# Patient Record
Sex: FEMALE | Race: WHITE | NOT HISPANIC OR LATINO | Employment: OTHER | URBAN - METROPOLITAN AREA
[De-identification: names, ages, dates, MRNs, and addresses within clinical notes are randomized per-mention and may not be internally consistent; named-entity substitution may affect disease eponyms.]

---

## 2017-04-10 ENCOUNTER — APPOINTMENT (OUTPATIENT)
Dept: PULMONOLOGY | Facility: HOSPICE | Age: 82
End: 2017-04-10
Payer: MEDICARE

## 2017-04-24 ENCOUNTER — OFFICE VISIT (OUTPATIENT)
Dept: CARDIOLOGY | Facility: PHYSICIAN GROUP | Age: 82
End: 2017-04-24
Payer: MEDICARE

## 2017-04-24 VITALS
BODY MASS INDEX: 15.16 KG/M2 | HEIGHT: 68 IN | WEIGHT: 100 LBS | SYSTOLIC BLOOD PRESSURE: 112 MMHG | HEART RATE: 102 BPM | OXYGEN SATURATION: 90 % | DIASTOLIC BLOOD PRESSURE: 74 MMHG

## 2017-04-24 DIAGNOSIS — E78.00 HYPERCHOLESTEREMIA: ICD-10-CM

## 2017-04-24 DIAGNOSIS — I51.7 LVH (LEFT VENTRICULAR HYPERTROPHY): ICD-10-CM

## 2017-04-24 DIAGNOSIS — I10 ESSENTIAL HYPERTENSION, BENIGN: ICD-10-CM

## 2017-04-24 DIAGNOSIS — I27.20 PULMONARY HYPERTENSION (HCC): ICD-10-CM

## 2017-04-24 DIAGNOSIS — I71.019 DISSECTION OF THORACIC AORTA (HCC): ICD-10-CM

## 2017-04-24 DIAGNOSIS — I11.9 BENIGN HYPERTENSIVE HEART DISEASE WITHOUT HEART FAILURE: ICD-10-CM

## 2017-04-24 DIAGNOSIS — I71.40 ABDOMINAL AORTIC ANEURYSM (AAA) WITHOUT RUPTURE (HCC): ICD-10-CM

## 2017-04-24 DIAGNOSIS — R94.31 NONSPECIFIC ABNORMAL ELECTROCARDIOGRAM (ECG) (EKG): ICD-10-CM

## 2017-04-24 PROCEDURE — G8419 CALC BMI OUT NRM PARAM NOF/U: HCPCS | Performed by: INTERNAL MEDICINE

## 2017-04-24 PROCEDURE — 99213 OFFICE O/P EST LOW 20 MIN: CPT | Performed by: INTERNAL MEDICINE

## 2017-04-24 PROCEDURE — 1101F PT FALLS ASSESS-DOCD LE1/YR: CPT | Mod: 8P | Performed by: INTERNAL MEDICINE

## 2017-04-24 PROCEDURE — 4040F PNEUMOC VAC/ADMIN/RCVD: CPT | Performed by: INTERNAL MEDICINE

## 2017-04-24 PROCEDURE — 1036F TOBACCO NON-USER: CPT | Performed by: INTERNAL MEDICINE

## 2017-04-24 PROCEDURE — G8432 DEP SCR NOT DOC, RNG: HCPCS | Performed by: INTERNAL MEDICINE

## 2017-04-24 ASSESSMENT — ENCOUNTER SYMPTOMS
COUGH: 0
WHEEZING: 0
PND: 0
BRUISES/BLEEDS EASILY: 0
ORTHOPNEA: 0
POLYDIPSIA: 0
MYALGIAS: 0

## 2017-04-24 ASSESSMENT — LIFESTYLE VARIABLES: SUBSTANCE_ABUSE: 0

## 2017-04-24 NOTE — PROGRESS NOTES
Subjective:   Janet Bartlett is a 89 y.o. female who presents today for follow-up of her hypertension primarily which has been recently very inactive on no medication. Her last CT aortogram showed healing of her aortic dissection and the small saccular aneurysm of her distal abdominal aorta.  No angina, palpitation or dyspnea.     Past Medical History   Diagnosis Date   • Hypertension    • Hypercholesteremia    • Mitral regurgitation    • Pulmonary hypertension (CMS-HCC)    • Aortic dissection (CMS-[facility])      [place] type B, healed as of last computed tomography scan in 2014.   • LVH (left ventricular hypertrophy) 2012     Anterior Q waves, probably due to LVH, normal wall motion and LVH confirmed by echo.    • Bronchiectasis (CMS-[facility])    • Empyema (CMS-[facility]) 5/2016     Left hemithorax, successfully drained and treated   • Aspiration pneumonia (CMS-[facility]) 5/2016   • Sepsis (CMS-[facility]) 5/2016   • Respiratory failure (CMS-[facility]) 5/2016   • Nonspecific abnormal electrocardiogram (ECG) (EKG)      See above, pattern stable.   • Abdominal aortic aneurysm (AAA) without rupture (CMS-[facility])      Small saccular aneurysm distal abdominal aorta      Past Surgical History   Procedure Laterality Date   • Other orthopedic surgery  open fracture reduction     Left arm   • Thoracotomy Left 5/27/2016     Procedure: THORACOTOMY Mini with;  Surgeon: Jose Lafleur M.D.;  Location: SURGERY [facility];  Service:    • Thoracoscopy Left 5/27/2016     Procedure: THORACOSCOPY;  Surgeon: Jose Lafleur M.D.;  Location: SURGERY [facility];  Service:    • Hip arthroplasty total Left      Prior to the above.     Family History   Problem Relation Age of Onset   • Heart Attack Mother    • Heart Attack Father    • Other Daughter      Multiple Sclerosis     History   Smoking status   • Never Smoker    Smokeless tobacco   • Never Used     Allergies   Allergen Reactions   • Penicillins Hives     Outpatient Encounter Prescriptions as of 4/24/2017  "  Medication Sig Dispense Refill   • acetaminophen 650 MG Tab 650 mg by Per NG Tube route every 6 hours as needed (Mild Pain; (Pain scale 1-3); Temp greater than 100.5 F). 30 Tab 0   • aspirin (ASA) 81 MG Chew Tab chewable tablet 1 Tab by Per NG Tube route every day. 100 Tab 11     No facility-administered encounter medications on file as of 4/24/2017.     Review of Systems   HENT: Negative for nosebleeds.    Respiratory: Negative for cough and wheezing.    Cardiovascular: Negative for orthopnea and PND.   Musculoskeletal: Negative for myalgias.   Endo/Heme/Allergies: Negative for polydipsia. Does not bruise/bleed easily.   Psychiatric/Behavioral: Negative for substance abuse.        Objective:   /74 mmHg  Pulse 102  Ht 1.727 m (5' 8\")  Wt 45.36 kg (100 lb)  BMI 15.21 kg/m2  SpO2 90%    Physical Exam   Constitutional: She is oriented to person, place, and time. She appears well-developed and well-nourished. No distress.   Eyes: Conjunctivae are normal. No scleral icterus.   Neck: No JVD present.   Cardiovascular: Normal rate, regular rhythm, S1 normal, S2 normal and intact distal pulses.  Exam reveals no gallop.    Murmur (2/6sem) heard.  Pulmonary/Chest: Effort normal and breath sounds normal.   Musculoskeletal: She exhibits no edema.   Neurological: She is alert and oriented to person, place, and time.   Skin: Skin is warm and dry.   Psychiatric: She has a normal mood and affect. Thought content normal.       Assessment:     1. Essential hypertension, benign     2. Benign hypertensive heart disease without heart failure     3. LVH (left ventricular hypertrophy)     4. Pulmonary hypertension (CMS-HCC)     5. Nonspecific abnormal electrocardiogram (ECG) (EKG)     6. Hypercholesteremia     7. Dissection of thoracic aorta (CMS-HCC)     8. Abdominal aortic aneurysm (AAA) without rupture (CMS-HCC)       The above assessed cardiovascular problems are clinically stable.  Her electrocardiograms are always read " as showing an old anterior infarct which has been excluded by serial echocardiograms. Electrocardiogram and immobilizer due to her left ventricular hypertrophy.   Medical Decision Making:  Today's Assessment / Status / Plan:     The situation is exactly as described on her last visit 4 months ago. She is still completely disabled from the hip is trying to decide what to do about that. She is of course at significant risk from any invasive procedure at her age with her cardiovascular problems but she is clinically stable and she did well with the left hip previously.  I will see her in 4 months and sooner if needed for any change in status.

## 2017-04-24 NOTE — Clinical Note
[facility] [address]  Phone: [phone]  Fax: [phone]              Janet Bartlett  11/24/1927    Encounter Date: 4/24/2017    Armani Arguelles M.D.          PROGRESS NOTE:  Subjective:   Janet Bartlett is a 89 y.o. female who presents today for follow-up of her hypertension primarily which has been recently very inactive on no medication. Her last CT aortogram showed healing of her aortic dissection and the small saccular aneurysm of her distal abdominal aorta.  No angina, palpitation or dyspnea.     Past Medical History   Diagnosis Date   • Hypertension    • Hypercholesteremia    • Mitral regurgitation    • Pulmonary hypertension (CMS-HCC)    • Aortic dissection (CMS-HCC)      [place] type B, healed as of last computed tomography scan in 2014.   • LVH (left ventricular hypertrophy) 2012     Anterior Q waves, probably due to LVH, normal wall motion and LVH confirmed by echo.    • Bronchiectasis (CMS-HCC)    • Empyema (CMS-HCC) 5/2016     Left hemithorax, successfully drained and treated   • Aspiration pneumonia (CMS-HCC) 5/2016   • Sepsis (CMS-HCC) 5/2016   • Respiratory failure (CMS-HCC) 5/2016   • Nonspecific abnormal electrocardiogram (ECG) (EKG)      See above, pattern stable.   • Abdominal aortic aneurysm (AAA) without rupture (CMS-HCC)      Small saccular aneurysm distal abdominal aorta      Past Surgical History   Procedure Laterality Date   • Other orthopedic surgery  open fracture reduction     Left arm   • Thoracotomy Left 5/27/2016     Procedure: THORACOTOMY Mini with;  Surgeon: Jose Lafleur M.D.;  Location: SURGERY [facility];  Service:    • Thoracoscopy Left 5/27/2016     Procedure: THORACOSCOPY;  Surgeon: Jose Lafleur M.D.;  Location: SURGERY [facility];  Service:    • Hip arthroplasty total Left      Prior to the above.     Family History   Problem Relation Age of Onset   • Heart Attack  "Mother    • Heart Attack Father    • Other Daughter      Multiple Sclerosis     History   Smoking status   • Never Smoker    Smokeless tobacco   • Never Used     Allergies   Allergen Reactions   • Penicillins Hives     Outpatient Encounter Prescriptions as of 4/24/2017   Medication Sig Dispense Refill   • acetaminophen 650 MG Tab 650 mg by Per NG Tube route every 6 hours as needed (Mild Pain; (Pain scale 1-3); Temp greater than 100.5 F). 30 Tab 0   • aspirin (ASA) 81 MG Chew Tab chewable tablet 1 Tab by Per NG Tube route every day. 100 Tab 11     No facility-administered encounter medications on file as of 4/24/2017.     Review of Systems   HENT: Negative for nosebleeds.    Respiratory: Negative for cough and wheezing.    Cardiovascular: Negative for orthopnea and PND.   Musculoskeletal: Negative for myalgias.   Endo/Heme/Allergies: Negative for polydipsia. Does not bruise/bleed easily.   Psychiatric/Behavioral: Negative for substance abuse.        Objective:   /74 mmHg  Pulse 102  Ht 1.727 m (5' 8\")  Wt 45.36 kg (100 lb)  BMI 15.21 kg/m2  SpO2 90%    Physical Exam   Constitutional: She is oriented to person, place, and time. She appears well-developed and well-nourished. No distress.   Eyes: Conjunctivae are normal. No scleral icterus.   Neck: No JVD present.   Cardiovascular: Normal rate, regular rhythm, S1 normal, S2 normal and intact distal pulses.  Exam reveals no gallop.    Murmur (2/6sem) heard.  Pulmonary/Chest: Effort normal and breath sounds normal.   Musculoskeletal: She exhibits no edema.   Neurological: She is alert and oriented to person, place, and time.   Skin: Skin is warm and dry.   Psychiatric: She has a normal mood and affect. Thought content normal.       Assessment:     1. Essential hypertension, benign     2. Benign hypertensive heart disease without heart failure     3. LVH (left ventricular hypertrophy)     4. Pulmonary hypertension (CMS-HCC)     5. Nonspecific abnormal " electrocardiogram (ECG) (EKG)     6. Hypercholesteremia     7. Dissection of thoracic aorta (CMS-HCC)     8. Abdominal aortic aneurysm (AAA) without rupture (CMS-HCC)       The above assessed cardiovascular problems are clinically stable.  Her electrocardiograms are always read as showing an old anterior infarct which has been excluded by serial echocardiograms. Electrocardiogram and immobilizer due to her left ventricular hypertrophy.   Medical Decision Making:  Today's Assessment / Status / Plan:     The situation is exactly as described on her last visit 4 months ago. She is still completely disabled from the hip is trying to decide what to do about that. She is of course at significant risk from any invasive procedure at her age with her cardiovascular problems but she is clinically stable and she did well with the left hip previously.  I will see her in 4 months and sooner if needed for any change in status.        No Recipients

## 2017-05-03 ENCOUNTER — OFFICE VISIT (OUTPATIENT)
Dept: PULMONOLOGY | Facility: HOSPICE | Age: 82
End: 2017-05-03
Payer: MEDICARE

## 2017-05-03 VITALS
WEIGHT: 102 LBS | BODY MASS INDEX: 15.46 KG/M2 | HEIGHT: 68 IN | SYSTOLIC BLOOD PRESSURE: 122 MMHG | DIASTOLIC BLOOD PRESSURE: 74 MMHG | RESPIRATION RATE: 16 BRPM | TEMPERATURE: 97.9 F | HEART RATE: 74 BPM | OXYGEN SATURATION: 93 %

## 2017-05-03 DIAGNOSIS — J86.9 EMPYEMA (HCC): ICD-10-CM

## 2017-05-03 DIAGNOSIS — I27.20 PULMONARY HYPERTENSION (HCC): ICD-10-CM

## 2017-05-03 DIAGNOSIS — J69.0 ASPIRATION PNEUMONIA, UNSPECIFIED ASPIRATION PNEUMONIA TYPE, UNSPECIFIED LATERALITY, UNSPECIFIED PART OF LUNG (HCC): ICD-10-CM

## 2017-05-03 DIAGNOSIS — J90 PLEURAL EFFUSION: ICD-10-CM

## 2017-05-03 PROCEDURE — G8419 CALC BMI OUT NRM PARAM NOF/U: HCPCS | Performed by: INTERNAL MEDICINE

## 2017-05-03 PROCEDURE — G8432 DEP SCR NOT DOC, RNG: HCPCS | Performed by: INTERNAL MEDICINE

## 2017-05-03 PROCEDURE — 4040F PNEUMOC VAC/ADMIN/RCVD: CPT | Performed by: INTERNAL MEDICINE

## 2017-05-03 PROCEDURE — 99214 OFFICE O/P EST MOD 30 MIN: CPT | Performed by: INTERNAL MEDICINE

## 2017-05-03 PROCEDURE — 1101F PT FALLS ASSESS-DOCD LE1/YR: CPT | Mod: 8P | Performed by: INTERNAL MEDICINE

## 2017-05-03 PROCEDURE — 1036F TOBACCO NON-USER: CPT | Performed by: INTERNAL MEDICINE

## 2017-05-03 RX ORDER — OMEGA-3 FATTY ACIDS/FISH OIL 300-1000MG
CAPSULE ORAL
COMMUNITY

## 2017-05-03 NOTE — Clinical Note
Eric Ko M.D.  [facility] Pulmonary [facility]   [address] [place] [address] [place] [address]  Phone: [phone] - Fax: [phone]           Encounter Date: 5/3/2017  Provider: Eric Ko M.D.  Location of Care: [facility] PULMONARY MEDICINE      Patient:   Janet Bartlett   MR Number: [id number]   YOB: 1927     PROGRESS NOTE:  Chief Complaint   Patient presents with   • Follow-Up     Review PFT and Labs       HPI:  The patient is a very thin, elderly, never smoker who was hospitalized in May 2016 with an aspiration pneumonia and empyema that required decortication. Pulmonary function testing in October 2016 demonstrated an FEV1 of 1.51 L which is 72% of predicted. Her lung volumes demonstrated hyperinflation. DLCO was not done. CT scan in July 2016 showed some residual pleural thickening. The patient also has a history of pulmonary hypertension on an echocardiogram when she was hospitalized. She had a 6 minute walk which showed no evidence of oxygen desaturation. At this time she is functioning well. She is accompanied by her granddaughter. She is not requiring any significant bronchodilator medications. She does have chronic hip pain. She has had a prior hip replacement and is considering a second hip replacement surgery. It sounds as though her discomfort is treated adequately with Advil on an as-needed basis.    Past Medical History   Diagnosis Date   • Hypertension    • Hypercholesteremia    • Mitral regurgitation    • Pulmonary hypertension (CMS-[facility])    • Aortic dissection (CMS-[facility])      [place] type B, healed as of last computed tomography scan in 2014.   • LVH (left ventricular hypertrophy) 2012     Anterior Q waves, probably due to LVH, normal wall motion and LVH confirmed by echo.    • Bronchiectasis (CMS-[facility])    • Empyema (CMS-[facility]) 5/2016     Left hemithorax, successfully drained and treated   • Aspiration pneumonia (CMS-[facility]) 5/2016   • Sepsis  (CMS-HCC) 5/2016   • Respiratory failure (CMS-HCC) 5/2016   • Nonspecific abnormal electrocardiogram (ECG) (EKG)      See above, pattern stable.   • Abdominal aortic aneurysm (AAA) without rupture (CMS-HCC)      Small saccular aneurysm distal abdominal aorta        ROS:   Constitutional: Denies fevers, chills, night sweats, fatigue or weight loss  Eyes: Denies vision loss, pain, drainage, double vision  Ears, Nose, Throat: Denies earache, tinnitus, hoarseness  Cardiovascular: Denies chest pain, tightness, palpitations  Respiratory: Denies  sputum production, cough, hemoptysis. Has some chronic dyspnea.  Sleep: Denies, snoring, apnea  GI: Denies abdominal pain, nausea, vomiting, diarrhea  : Denies frequent urination, hematuria, painful urination  Musculoskeletal: Denies back pain,  sore muscles. Has chronic right hip pain  Neurological: Denies headaches, seizures  Skin: Denies rashes, color changes  Psychiatric: Denies depression or thoughts of suicide  Hematologic: Denies bleeding tendency or clotting tendency  Allergic/Immunologic: Denies rhinitis, skin sensitivity    Social History     Social History   • Marital Status:      Spouse Name: N/A   • Number of Children: N/A   • Years of Education: N/A     Occupational History   • Not on file.     Social History Main Topics   • Smoking status: Never Smoker    • Smokeless tobacco: Never Used   • Alcohol Use: No   • Drug Use: No   • Sexual Activity: Not on file     Other Topics Concern   • Not on file     Social History Narrative     Penicillins  Current Outpatient Prescriptions on File Prior to Visit   Medication Sig Dispense Refill   • aspirin (ASA) 81 MG Chew Tab chewable tablet 1 Tab by Per NG Tube route every day. 100 Tab 11   • acetaminophen 650 MG Tab 650 mg by Per NG Tube route every 6 hours as needed (Mild Pain; (Pain scale 1-3); Temp greater than 100.5 F). (Patient not taking: Reported on 5/3/2017) 30 Tab 0     No current facility-administered  "medications on file prior to visit.     Blood pressure 122/74, pulse 74, temperature 36.6 °C (97.9 °F), resp. rate 16, height 1.727 m (5' 8\"), weight 46.267 kg (102 lb), SpO2 93 %.  Family History   Problem Relation Age of Onset   • Heart Attack Mother    • Heart Attack Father    • Other Daughter      Multiple Sclerosis       Physical Exam:  Thin elderly, no acute distress  HEENT: PERRLA, EOMI, no scleral icterus, no nasal or oral lesions  Neck: No thyromegaly, no adenopathy, no bruits  M[place]ampatti: Grade II  Lungs: Equal breath sounds, no wheezes or crackles  Heart: Regular rate and rhythm, no gallops or murmurs  Abdomen: Soft, benign, no organomegaly  Extremities: No clubbing, cyanosis, or edema  Neurologic: Cranial nerve, motor, and sensory exam are normal    1. Pulmonary hypertension (CMS-HCC)    2. Aspiration pneumonia, unspecified aspiration pneumonia type, unspecified laterality, unspecified part of lung (CMS-HCC)    3. Pleural effusion    4. Empyema (CMS-HCC)        She has recovered very nicely from her episode of empyema. She is not requiring any supplemental oxygen or any bronchodilators.  Her pulmonary function testing is consistent with some obstructive lung disease. She is a never smoker and has never had asthma.  These may be age-related changes.  She had pulmonary hypertension when she was hospitalized. Repeat echocardiogram at some time may be indicated.  I did discuss with her the fact that surgery is not without complications at times.  I did advise caution around proceeding with any future surgery because of her obstructive lung disease and history of pulmonary hypertension.        Electronically signed by Eric Ko M.D.  on 05/06/2017    No Recipients                    "

## 2017-05-03 NOTE — MR AVS SNAPSHOT
"        Janet Bartlett   5/3/2017 10:20 AM   Office Visit   MRN: [id number]    Department:  [facility]   Dept Phone:  [phone]    Description:  Female : 1927   Provider:  Eric Ko M.D.           Reason for Visit     Follow-Up Review PFT and Labs      Allergies as of 5/3/2017     Allergen Noted Reactions    Penicillins 2012   Hives      You were diagnosed with     Pulmonary hypertension (CMS-HCC)   [633818]       Aspiration pneumonia, unspecified aspiration pneumonia type, unspecified laterality, unspecified part of lung (CMS-HCC)   [5186031]       Pleural effusion   [602311]       Empyema (CMS-HCC)   [8249735]         Vital Signs     Blood Pressure Pulse Temperature Respirations Height Weight    122/74 mmHg 74 36.6 °C (97.9 °F) 16 1.727 m (5' 8\") 46.267 kg (102 lb)    Body Mass Index Oxygen Saturation Smoking Status             15.51 kg/m2 93% Never Smoker          Basic Information     Date Of Birth Sex Race Ethnicity Preferred Language    1927 Female White Non- English      Your appointments     Aug 21, 2017  2:40 PM   FOLLOW UP with Armani Arguelles M.D.   [facility]    [address] [place] [address] [phone]              Problem List              ICD-10-CM Priority Class Noted - Resolved    LVH (left ventricular hypertrophy) I51.7 Medium  Unknown - Present    Hypercholesteremia E78.00 Low  Unknown - Present    Tricuspid regurgitation I07.1 Medium  Unknown - Present    Pulmonary hypertension (CMS-HCC) I27.2 Medium  Unknown - Present    Benign hypertensive heart disease without heart failure I11.9 High  2012 - Present    Nonspecific abnormal electrocardiogram (ECG) (EKG) R94.31 Medium  3/11/2012 - Present    Vasovagal syncope R55 Medium  2013 - Present    Aortic dissection (CMS-HCC) I71.00 Low  2014 - Present    Pleural effusion J90 High  2016 - Present    Dysphagia R13.10 Medium  " 5/25/2016 - Present    Aspiration pneumonia (CMS-HCC) J69.0 High  5/25/2016 - Present    Essential hypertension, benign I10   12/22/2016 - Present    Abdominal aortic aneurysm (AAA) without rupture (CMS-HCC) I71.4   Unknown - Present      Health Maintenance        Date Due Completion Dates    IMM DTaP/Tdap/Td Vaccine (1 - Tdap) 11/24/1946 ---    PAP SMEAR 11/24/1948 ---    MAMMOGRAM 11/24/1967 ---    COLONOSCOPY 11/24/1977 ---    IMM ZOSTER VACCINE 11/24/1987 ---    BONE DENSITY 11/24/1992 ---            Current Immunizations     13-VALENT PCV PREVNAR 1/1/2016    Influenza TIV (IM) 10/24/2013    Influenza Vaccine Adult HD 10/10/2016  1:37 PM    Pneumococcal polysaccharide vaccine (PPSV-23) 4/24/2009      Below and/or attached are the medications your provider expects you to take. Review all of your home medications and newly ordered medications with your provider and/or pharmacist. Follow medication instructions as directed by your provider and/or pharmacist. Please keep your medication list with you and share with your provider. Update the information when medications are discontinued, doses are changed, or new medications (including over-the-counter products) are added; and carry medication information at all times in the event of emergency situations     Allergies:  PENICILLINS - Hives               Medications  Valid as of: May 03, 2017 - 11:00 AM    Generic Name Brand Name Tablet Size Instructions for use    Acetaminophen (Tab) Acetaminophen 650  mg by Per NG Tube route every 6 hours as needed (Mild Pain; (Pain scale 1-3); Temp greater than 100.5 F).        Aspirin (Chew Tab) ASA 81 MG 1 Tab by Per NG Tube route every day.        Ibuprofen (Cap) Ibuprofen 200 MG Take  by mouth.        .                 Medicines prescribed today were sent to:     [email or web address] [place] - [address] [place] [address] [address] [facility] [address]    Phone: [phone] Fax: [phone]    Open 24 Hours?: No       Medication refill instructions:       If your prescription bottle indicates you have medication refills left, it is not necessary to call your provider’s office. Please contact your pharmacy and they will refill your medication.    If your prescription bottle indicates you do not have any refills left, you may request refills at any time through one of the following ways: The online Exeros system (except Urgent Care), by calling your provider’s office, or by asking your pharmacy to contact your provider’s office with a refill request. Medication refills are processed only during regular business hours and may not be available until the next business day. Your provider may request additional information or to have a follow-up visit with you prior to refilling your medication.   *Please Note: Medication refills are assigned a new Rx number when refilled electronically. Your pharmacy may indicate that no refills were authorized even though a new prescription for the same medication is available at the pharmacy. Please request the medicine by name with the pharmacy before contacting your provider for a refill.           Exeros Access Code: [email or web address]  Expires: 5/24/2017  8:33 AM    Exeros  A secure, online tool to manage your health information     [employer] Exeros® is a secure, online tool that connects you to your personalized health information from the privacy of your home -- day or night - making it very easy for you to manage your healthcare. Once the activation process is completed, you can even access your medical information using the Exeros wade, which is available for free in the Apple Wade store or Google Play store.     Exeros provides the following levels of access (as shown below):   My Chart Features   [facility] Doctor [facility]  Specialists [facility]  Urgent  Care Non-[facility]  Primary Care  Doctor   Email your healthcare team securely and privately 24/7 X X X    Manage  appointments: schedule your next appointment; view details of past/upcoming appointments X      Request prescription refills. X      View recent personal medical records, including lab and immunizations X X X X   View health record, including health history, allergies, medications X X X X   Read reports about your outpatient visits, procedures, consult and ER notes X X X X   See your discharge summary, which is a recap of your hospital and/or ER visit that includes your diagnosis, lab results, and care plan. X X       How to register for [employer]:  1. Go to  [email or web address].  2. Click on the Sign Up Now box, which takes you to the New Member Sign Up page. You will need to provide the following information:  a. Enter your Storytree Access Code exactly as it appears at the top of this page. (You will not need to use this code after you’ve completed the sign-up process. If you do not sign up before the expiration date, you must request a new code.)   b. Enter your date of birth.   c. Enter your home email address.   d. Click Submit, and follow the next screen’s instructions.  3. Create a Storytree ID. This will be your Storytree login ID and cannot be changed, so think of one that is secure and easy to remember.  4. Create a Storytree password. You can change your password at any time.  5. Enter your Password Reset Question and Answer. This can be used at a later time if you forget your password.   6. Enter your e-mail address. This allows you to receive e-mail notifications when new information is available in Storytree.  7. Click Sign Up. You can now view your health information.    For assistance activating your Storytree account, call [phone]

## 2017-05-06 NOTE — PROGRESS NOTES
Chief Complaint   Patient presents with   • Follow-Up     Review PFT and Labs       HPI:  The patient is a very thin, elderly, never smoker who was hospitalized in May 2016 with an aspiration pneumonia and empyema that required decortication. Pulmonary function testing in October 2016 demonstrated an FEV1 of 1.51 L which is 72% of predicted. Her lung volumes demonstrated hyperinflation. DLCO was not done. CT scan in July 2016 showed some residual pleural thickening. The patient also has a history of pulmonary hypertension on an echocardiogram when she was hospitalized. She had a 6 minute walk which showed no evidence of oxygen desaturation. At this time she is functioning well. She is accompanied by her granddaughter. She is not requiring any significant bronchodilator medications. She does have chronic hip pain. She has had a prior hip replacement and is considering a second hip replacement surgery. It sounds as though her discomfort is treated adequately with Advil on an as-needed basis.    Past Medical History   Diagnosis Date   • Hypertension    • Hypercholesteremia    • Mitral regurgitation    • Pulmonary hypertension (CMS-[facility])    • Aortic dissection ([facility])      [place] type B, healed as of last computed tomography scan in 2014.   • LVH (left ventricular hypertrophy) 2012     Anterior Q waves, probably due to LVH, normal wall motion and LVH confirmed by echo.    • Bronchiectasis (CMS-HCC)    • Empyema (CMS-HCC) 5/2016     Left hemithorax, successfully drained and treated   • Aspiration pneumonia (CMS-[facility]) 5/2016   • Sepsis (CMS-[facility]) 5/2016   • Respiratory failure (CMS-[facility]) 5/2016   • Nonspecific abnormal electrocardiogram (ECG) (EKG)      See above, pattern stable.   • Abdominal aortic aneurysm (AAA) without rupture (CMS-[facility])      Small saccular aneurysm distal abdominal aorta        ROS:   Constitutional: Denies fevers, chills, night sweats, fatigue or weight loss  Eyes: Denies vision loss, pain, drainage,  "double vision  Ears, Nose, Throat: Denies earache, tinnitus, hoarseness  Cardiovascular: Denies chest pain, tightness, palpitations  Respiratory: Denies  sputum production, cough, hemoptysis. Has some chronic dyspnea.  Sleep: Denies, snoring, apnea  GI: Denies abdominal pain, nausea, vomiting, diarrhea  : Denies frequent urination, hematuria, painful urination  Musculoskeletal: Denies back pain,  sore muscles. Has chronic right hip pain  Neurological: Denies headaches, seizures  Skin: Denies rashes, color changes  Psychiatric: Denies depression or thoughts of suicide  Hematologic: Denies bleeding tendency or clotting tendency  Allergic/Immunologic: Denies rhinitis, skin sensitivity    Social History     Social History   • Marital Status:      Spouse Name: N/A   • Number of Children: N/A   • Years of Education: N/A     Occupational History   • Not on file.     Social History Main Topics   • Smoking status: Never Smoker    • Smokeless tobacco: Never Used   • Alcohol Use: No   • Drug Use: No   • Sexual Activity: Not on file     Other Topics Concern   • Not on file     Social History Narrative     Penicillins  Current Outpatient Prescriptions on File Prior to Visit   Medication Sig Dispense Refill   • aspirin (ASA) 81 MG Chew Tab chewable tablet 1 Tab by Per NG Tube route every day. 100 Tab 11   • acetaminophen 650 MG Tab 650 mg by Per NG Tube route every 6 hours as needed (Mild Pain; (Pain scale 1-3); Temp greater than 100.5 F). (Patient not taking: Reported on 5/3/2017) 30 Tab 0     No current facility-administered medications on file prior to visit.     Blood pressure 122/74, pulse 74, temperature 36.6 °C (97.9 °F), resp. rate 16, height 1.727 m (5' 8\"), weight 46.267 kg (102 lb), SpO2 93 %.  Family History   Problem Relation Age of Onset   • Heart Attack Mother    • Heart Attack Father    • Other Daughter      Multiple Sclerosis       Physical Exam:  Thin elderly, no acute distress  HEENT: [place] MILLER, no " scleral icterus, no nasal or oral lesions  Neck: No thyromegaly, no adenopathy, no bruits  Mallampatti: Grade II  Lungs: Equal breath sounds, no wheezes or crackles  Heart: Regular rate and rhythm, no gallops or murmurs  Abdomen: Soft, benign, no organomegaly  Extremities: No clubbing, cyanosis, or edema  Neurologic: Cranial nerve, motor, and sensory exam are normal    1. Pulmonary hypertension (CMS-[facility])    2. Aspiration pneumonia, unspecified aspiration pneumonia type, unspecified laterality, unspecified part of lung (CMS-[facility])    3. Pleural effusion    4. Empyema (CMS-[facility])        She has recovered very nicely from her episode of empyema. She is not requiring any supplemental oxygen or any bronchodilators.  Her pulmonary function testing is consistent with some obstructive lung disease. She is a never smoker and has never had asthma.  These may be age-related changes.  She had pulmonary hypertension when she was hospitalized. Repeat echocardiogram at some time may be indicated.  I did discuss with her the fact that surgery is not without complications at times.  I did advise caution around proceeding with any future surgery because of her obstructive lung disease and history of pulmonary hypertension.

## 2017-06-20 ENCOUNTER — TELEPHONE (OUTPATIENT)
Dept: PULMONOLOGY | Facility: HOSPICE | Age: 82
End: 2017-06-20

## 2017-06-20 DIAGNOSIS — J44.9 CHRONIC OBSTRUCTIVE PULMONARY DISEASE, UNSPECIFIED COPD TYPE (HCC): ICD-10-CM

## 2017-06-20 DIAGNOSIS — I27.20 PULMONARY HYPERTENSION (HCC): ICD-10-CM

## 2017-06-20 NOTE — TELEPHONE ENCOUNTER
Please sign for CNOX    Pt was discharged with o2 but it was not qualified correctly so they are paying out of pocket for nocturnal o2    Pt will have CNOX on room air and schedule a follow up to over results next week

## 2017-07-10 ENCOUNTER — OFFICE VISIT (OUTPATIENT)
Dept: PULMONOLOGY | Facility: HOSPICE | Age: 82
End: 2017-07-10
Payer: MEDICARE

## 2017-07-10 VITALS
DIASTOLIC BLOOD PRESSURE: 64 MMHG | HEART RATE: 102 BPM | OXYGEN SATURATION: 92 % | SYSTOLIC BLOOD PRESSURE: 110 MMHG | HEIGHT: 67 IN | BODY MASS INDEX: 15.07 KG/M2 | RESPIRATION RATE: 16 BRPM | WEIGHT: 96 LBS

## 2017-07-10 DIAGNOSIS — I27.20 PULMONARY HYPERTENSION (HCC): ICD-10-CM

## 2017-07-10 DIAGNOSIS — R09.02 HYPOXEMIA: ICD-10-CM

## 2017-07-10 DIAGNOSIS — J69.0 RECURRENT ASPIRATION PNEUMONIA (HCC): ICD-10-CM

## 2017-07-10 DIAGNOSIS — Z87.01 H/O: PNEUMONIA: ICD-10-CM

## 2017-07-10 DIAGNOSIS — R13.10 DYSPHAGIA, UNSPECIFIED TYPE: ICD-10-CM

## 2017-07-10 DIAGNOSIS — Z87.09 HISTORY OF PLEURAL EFFUSION: ICD-10-CM

## 2017-07-10 PROCEDURE — 99214 OFFICE O/P EST MOD 30 MIN: CPT | Performed by: NURSE PRACTITIONER

## 2017-07-10 RX ORDER — METHYLPREDNISOLONE 4 MG/1
TABLET ORAL
Refills: 0 | COMMUNITY
Start: 2017-06-27 | End: 2017-07-01

## 2017-07-10 RX ORDER — LEVOFLOXACIN 500 MG/1
TABLET, FILM COATED ORAL
Refills: 0 | COMMUNITY
Start: 2017-06-19 | End: 2017-07-01

## 2017-07-10 RX ORDER — CODEINE PHOSPHATE AND GUAIFENESIN 10; 100 MG/5ML; MG/5ML
LIQUID ORAL
Refills: 0 | COMMUNITY
Start: 2017-06-19 | End: 2017-07-01

## 2017-07-10 NOTE — PATIENT INSTRUCTIONS
Plan:    1) She has had recurrent pneumonia which may be related to chronic aspiration. She is feeling much better. Request records from her hospitalization in [place]. Repeat chest xray in 1 month for follow up on her Pneumonia.   2) Referral to speech pathology to evaluate for chronic aspiration.   3) Multi oximetry today in the office indicates adequate daytime saturations with a low 02 91% with exertion. She does not need 02 during the day. CNOX through her DME now to ensure adequate nocturnal saturations. She will call for results as she lives in [place]. If adequate then we will send an order to D/C home 02.  4) She declines need for inhalers.  5) Consider repeating Echo.  6) She is up to date on Prevnar 13 vaccines. Her last Pneumovax 23 per our records was in 2009. Recommend updated Influenza vaccine in the Fall as well as an updated Pneumovax 23 if not performed within the last 5-7 years.   7) Follow up in 1 month with repeat chest xray, sooner if needed.

## 2017-07-10 NOTE — MR AVS SNAPSHOT
"        Janet Bartlett   7/10/2017 3:00 PM   Office Visit   MRN: [id number]    Department:  [facility]   Dept Phone:  [phone]    Description:  Female : 1927   Provider:  MARY Sanchez           Reason for Visit     Follow-Up CNOX was not completed      Allergies as of 7/10/2017     Allergen Noted Reactions    Penicillins 2012   Hives      You were diagnosed with     Pulmonary hypertension (CMS-HCC)   [624695]       H/O: pneumonia   [431197]   with empyema, secondary to aspiration, resolved     History of pleural effusion   [747131]   Resolved     Hypoxemia   [799.02.ICD-9-CM]       Dysphagia, unspecified type   [2643316]       Recurrent aspiration pneumonia (CMS-HCC)   [964349]         Vital Signs     Blood Pressure Pulse Respirations Height Weight Body Mass Index    110/64 mmHg 102 16 1.702 m (5' 7.01\") 43.545 kg (96 lb) 15.03 kg/m2    Oxygen Saturation Smoking Status                92% Never Smoker           Basic Information     Date Of Birth Sex Race Ethnicity Preferred Language    1927 Female White Non- English      Your appointments     Aug 14, 2017  2:00 PM   Established Patient Pul with MARY Sanchez   [facility] (--)    [address] [place] [address] [address]  [facility] [id number] [phone]            Aug 21, 2017  2:40 PM   FOLLOW UP with Armani Arguelles M.D.   [facility]    [address] [facility] [address] [phone]              Problem List              ICD-10-CM Priority Class Noted - Resolved    LVH (left ventricular hypertrophy) I51.7 Medium  Unknown - Present    Hypercholesteremia E78.00 Low  Unknown - Present    Tricuspid regurgitation I07.1 Medium  Unknown - Present    Pulmonary hypertension (CMS-HCC) I27.2 Medium  Unknown - Present    Benign hypertensive heart disease without heart failure I11.9 High  2012 - Present    Nonspecific abnormal " electrocardiogram (ECG) (EKG) R94.31 Medium  3/11/2012 - Present    Vasovagal syncope R55 Medium  9/5/2013 - Present    Aortic dissection (CMS-HCC) I71.00 Low  4/24/2014 - Present    Pleural effusion J90 High  5/24/2016 - Present    Dysphagia R13.10 Medium  5/25/2016 - Present    Aspiration pneumonia (CMS-HCC) J69.0 High  5/25/2016 - Present    Essential hypertension, benign I10   12/22/2016 - Present    Abdominal aortic aneurysm (AAA) without rupture (CMS-HCC) I71.4   Unknown - Present      Health Maintenance        Date Due Completion Dates    IMM DTaP/Tdap/Td Vaccine (1 - Tdap) 11/24/1946 ---    PAP SMEAR 11/24/1948 ---    MAMMOGRAM 11/24/1967 ---    COLONOSCOPY 11/24/1977 ---    IMM ZOSTER VACCINE 11/24/1987 ---    BONE DENSITY 11/24/1992 ---    IMM INFLUENZA (1) 9/1/2017 10/10/2016, 10/24/2013            Current Immunizations     13-VALENT PCV PREVNAR 1/1/2016    Influenza TIV (IM) 10/24/2013    Influenza Vaccine Adult HD 10/10/2016  1:37 PM    Pneumococcal polysaccharide vaccine (PPSV-23) 4/24/2009      Below and/or attached are the medications your provider expects you to take. Review all of your home medications and newly ordered medications with your provider and/or pharmacist. Follow medication instructions as directed by your provider and/or pharmacist. Please keep your medication list with you and share with your provider. Update the information when medications are discontinued, doses are changed, or new medications (including over-the-counter products) are added; and carry medication information at all times in the event of emergency situations     Allergies:  PENICILLINS - Hives               Medications  Valid as of: July 10, 2017 -  3:58 PM    Generic Name Brand Name Tablet Size Instructions for use    Acetaminophen (Tab) Acetaminophen 650  mg by Per NG Tube route every 6 hours as needed (Mild Pain; (Pain scale 1-3); Temp greater than 100.5 F).        Aspirin (Chew Tab) ASA 81 MG 1 Tab by Per  NG Tube route every day.        Ibuprofen (Cap) Ibuprofen 200 MG Take  by mouth.        .                 Medicines prescribed today were sent to:     [facility] - [place], [place] - [address] [place] [address] STREET    [facility] [address] [facility] [address]    Phone: [phone] Fax: [phone]    Open 24 Hours?: No      Medication refill instructions:       If your prescription bottle indicates you have medication refills left, it is not necessary to call your provider’s office. Please contact your pharmacy and they will refill your medication.    If your prescription bottle indicates you do not have any refills left, you may request refills at any time through one of the following ways: The online [employer] system (except Urgent Care), by calling your provider’s office, or by asking your pharmacy to contact your provider’s office with a refill request. Medication refills are processed only during regular business hours and may not be available until the next business day. Your provider may request additional information or to have a follow-up visit with you prior to refilling your medication.   *Please Note: Medication refills are assigned a new Rx number when refilled electronically. Your pharmacy may indicate that no refills were authorized even though a new prescription for the same medication is available at the pharmacy. Please request the medicine by name with the pharmacy before contacting your provider for a refill.        Your To Do List     Future Labs/Procedures Complete By Expires    DX-CHEST-2 VIEWS  8/10/2017 (Approximate) 7/10/2018    Scheduling Instructions:    When: in 1 month   Where: Pulmonary clinic      Referral     A referral request has been sent to our patient care coordination department. Please allow 3-5 business days for us to process this request and contact you either by phone or mail. If you do not hear from us by the 5th business day, please call us at [phone].        Instructions       Plan:    1) She has had recurrent pneumonia which may be related to chronic aspiration. She is feeling much better. Request records from her hospitalization in [place]. Repeat chest xray in 1 month for follow up on her Pneumonia.   2) Referral to speech pathology to evaluate for chronic aspiration.   3) Multi oximetry today in the office indicates adequate daytime saturations with a low 02 91% with exertion. She does not need 02 during the day. CNOX through her DME now to ensure adequate nocturnal saturations. She will call for results as she lives in [place]. If adequate then we will send an order to D/C home 02.  4) She declines need for inhalers.  5) Consider repeating Echo.  6) She is up to date on Prevnar 13 vaccines. Her last Pneumovax 23 per our records was in 2009. Recommend updated Influenza vaccine in the Fall as well as an updated Pneumovax 23 if not performed within the last 5-7 years.   7) Follow up in 1 month with repeat chest xray, sooner if needed.           LUXA Access Code: [email or web address]  Expires: 8/6/2017  9:51 AM    LUXA  A secure, online tool to manage your health information     [employer]’s LUXA® is a secure, online tool that connects you to your personalized health information from the privacy of your home -- day or night - making it very easy for you to manage your healthcare. Once the activation process is completed, you can even access your medical information using the LUXA wade, which is available for free in the Apple Wade store or Google Play store.     LUXA provides the following levels of access (as shown below):   My Chart Features   [facility] Doctor [facility]  Specialists [facility]  Urgent  Care Non-[facility]  Primary Care  Doctor   Email your healthcare team securely and privately 24/7 X X X    Manage appointments: schedule your next appointment; view details of past/upcoming appointments X      Request prescription refills. X      View recent personal  medical records, including lab and immunizations X X X X   View health record, including health history, allergies, medications X X X X   Read reports about your outpatient visits, procedures, consult and ER notes X X X X   See your discharge summary, which is a recap of your hospital and/or ER visit that includes your diagnosis, lab results, and care plan. X X       How to register for CRE Secure:  1. Go to  [email or web address].  2. Click on the Sign Up Now box, which takes you to the New Member Sign Up page. You will need to provide the following information:  a. Enter your CRE Secure Access Code exactly as it appears at the top of this page. (You will not need to use this code after you’ve completed the sign-up process. If you do not sign up before the expiration date, you must request a new code.)   b. Enter your date of birth.   c. Enter your home email address.   d. Click Submit, and follow the next screen’s instructions.  3. Create a CRE Secure ID. This will be your CRE Secure login ID and cannot be changed, so think of one that is secure and easy to remember.  4. Create a CRE Secure password. You can change your password at any time.  5. Enter your Password Reset Question and Answer. This can be used at a later time if you forget your password.   6. Enter your e-mail address. This allows you to receive e-mail notifications when new information is available in CRE Secure.  7. Click Sign Up. You can now view your health information.    For assistance activating your CRE Secure account, call [phone]

## 2017-07-10 NOTE — PROGRESS NOTES
Chief Complaint   Patient presents with   • Follow-Up     CNOX was not completed       HPI:  Janet Bartlett is a 89 y.o. year old female here today for follow-up on her hypoxemia. She was last seen in the office in May 2017 with Dr. Ko. She has a history of an aspiration pneumonia and empyema that required decortication in May 2016. Pulmonary function testing in October 2016 demonstrated an FEV1 of 1.51 L which is 72% of predicted. Her lung volumes demonstrated hyperinflation. DLCO was not done. CT scan in July 2016 showed some residual pleural thickening. She also has a history of pulmonary hypertension on an echocardiogram when she was hospitalized. She had a 6 minute walk test October 2016 which showed no evidence of oxygen desaturation. She is not on inhalers. She does have chronic hip pain.  She states since her last office visit she was admitted to the hospital with pneumonia. She was hospitalized 6/16-6/19/2017. She was treated in hospital with antibiotics. She was also discharged on antibiotics which she completed, but is unsure of what antibiotic. She also sent home on 02 at 2 LPM. She is feeling much better and would like to see if she can go off the oxygen. She denies current cough or mucous. She denies hemoptysis. She denies wheezing. She continue to cough after drinking liquids. She denies any fevers or chills. She notes mild dyspnea with exertion which she feels is unchanged.       Past Medical History   Diagnosis Date   • Hypertension    • Hypercholesteremia    • Mitral regurgitation    • Pulmonary hypertension (CMS-HCC)    • Aortic dissection (CMS-[facility])      [place] type B, healed as of last computed tomography scan in 2014.   • LVH (left ventricular hypertrophy) 2012     Anterior Q waves, probably due to LVH, normal wall motion and LVH confirmed by echo.    • Bronchiectasis (CMS-[facility])    • Empyema (CMS-[facility]) 5/2016     Left hemithorax, successfully drained and treated   • Aspiration pneumonia  (CMS-HCC) 5/2016   • Sepsis (CMS-HCC) 5/2016   • Respiratory failure (CMS-HCC) 5/2016   • Nonspecific abnormal electrocardiogram (ECG) (EKG)      See above, pattern stable.   • Abdominal aortic aneurysm (AAA) without rupture (CMS-HCC)      Small saccular aneurysm distal abdominal aorta        Past Surgical History   Procedure Laterality Date   • Other orthopedic surgery  open fracture reduction     Left arm   • Thoracotomy Left 5/27/2016     Procedure: THORACOTOMY Mini with;  Surgeon: Jose Lafleur M.D.;  Location: SURGERY [facility];  Service:    • Thoracoscopy Left 5/27/2016     Procedure: THORACOSCOPY;  Surgeon: Jose Lafleur M.D.;  Location: SURGERY [facility];  Service:    • Hip arthroplasty total Left      Prior to the above.       Family History   Problem Relation Age of Onset   • Heart Attack Mother    • Heart Attack Father    • Other Daughter      Multiple Sclerosis       Social History     Social History   • Marital Status:      Spouse Name: N/A   • Number of Children: N/A   • Years of Education: N/A     Occupational History   • Not on file.     Social History Main Topics   • Smoking status: Never Smoker    • Smokeless tobacco: Never Used   • Alcohol Use: No   • Drug Use: No   • Sexual Activity: Not on file     Other Topics Concern   • Not on file     Social History Narrative         ROS:  Constitutional: Denies fevers, chills, sweats, fatigue, weight loss  Eyes: Denies glasses, vision loss, pain, drainage, double vision  Ears/Nose/Mouth/Throat: Denies rhinitis, nasal congestion, ear ache, difficulty hearing, sore throat, persistent hoarseness, decayed teeth/toothache  Cardiovascular: Denies chest pain, tightness, palpitations, swelling in feet/legs, fainting, difficulty breathing when laying down  Respiratory: See HPI   GI: Denies heartburn,  nausea, vomiting, abdominal pain, diarrhea, constipation. Positive for dysphagia   : Denies frequent urination, painful  "urination  Integumentary: Denies rashes, lumps or color changes  MSK: Positive for hip pain   Neurological: Denies frequent headaches, dizziness, weakness        Current Outpatient Prescriptions on File Prior to Visit   Medication Sig Dispense Refill   • aspirin (ASA) 81 MG Chew Tab chewable tablet 1 Tab by Per NG Tube route every day. 100 Tab 11   • Ibuprofen (ADVIL) 200 MG Cap Take  by mouth.     • acetaminophen 650 MG Tab 650 mg by Per NG Tube route every 6 hours as needed (Mild Pain; (Pain scale 1-3); Temp greater than 100.5 F). (Patient not taking: Reported on 5/3/2017) 30 Tab 0     No current facility-administered medications on file prior to visit.     Penicillins    Blood pressure 110/64, pulse 102, resp. rate 16, height 1.702 m (5' 7.01\"), weight 43.545 kg (96 lb), SpO2 92 %.  PE:   Appearance: Thin elderly female, no acute distress  Eyes: PERRL, EOM intact, sclera white, conjunctiva moist  Ears: no lesions or deformities  Hearing: grossly intact  Nose: no lesions or deformities  Oropharynx: tongue normal, posterior pharynx without erythema or exudate  Neck: supple, trachea midline, no masses   Respiratory effort: no intercostal retractions or use of accessory muscles  Lung auscultation: no rales, rhonchi or wheezes, somewhat diminished in bases   Heart auscultation: no murmur rub or gallop  Extremities: no cyanosis or edema  Abdomen: soft ,non tender, no masses  Gait and Station: ambulates with cane   Digits and nails: no clubbing, cyanosis, petechiae or nodes.  Cranial nerves: grossly intact  Skin: no rashes, lesions or ulcers noted  Orientation: Oriented to time, person and place  Mood and affect: mood and affect appropriate, normal interaction with examiner  Judgement: Intact          Assessment:  1. Pulmonary hypertension (CMS-HCC)     2. H/O: pneumonia      with empyema, secondary to aspiration, resolved    3. History of pleural effusion      Resolved    4. Hypoxemia  AMB MULTIPLE OXIMETRY    DME CNOX " BY DME CO   5. Dysphagia, unspecified type  REFERRAL TO OTHER   6. Recurrent aspiration pneumonia (CMS-HCC)  REFERRAL TO OTHER    DX-CHEST-2 VIEWS         Plan:    1) She has had recurrent pneumonia which may be related to chronic aspiration. She is feeling much better. Request records from her hospitalization in [place]. Repeat chest xray in 1 month for follow up on her Pneumonia.   2) Referral to speech pathology to evaluate for chronic aspiration.   3) Multi oximetry today in the office indicates adequate daytime saturations with a low 02 91% with exertion. She does not need 02 during the day. CNOX through her DME now to ensure adequate nocturnal saturations. She will call for results as she lives in [place]. If adequate then we will send an order to D/C home 02.  4) She declines need for inhalers.  5) Consider repeating Echo.  6) She is up to date on Prevnar 13 vaccines. Her last Pneumovax 23 per our records was in 2009. Recommend updated Influenza vaccine in the Fall as well as an updated Pneumovax 23 if not performed within the last 5-7 years.   7) Follow up in 1 month with repeat chest xray, sooner if needed.

## 2017-08-09 ENCOUNTER — TELEPHONE (OUTPATIENT)
Dept: PULMONOLOGY | Facility: HOSPICE | Age: 82
End: 2017-08-09

## 2017-08-09 NOTE — TELEPHONE ENCOUNTER
I let the pt know about the OPO and her appt.  They want to know where she can go to speech therapy now, if you know where I can send the referral I will send it with notes, etc.  Please advise, thank you.

## 2017-08-09 NOTE — TELEPHONE ENCOUNTER
The pt's granddaughter, Kezia Baker called and is asking for the results of the patient's OPO.  She is wanting to get off of her nightime 02.  Also the speech therapy people no longer go to [facility] so the referral needs to go elsewhere.  Please advise, thank you.

## 2017-08-09 NOTE — TELEPHONE ENCOUNTER
Please inform family that the overnight oxygen test will be discussed at her next visit. It was borderline and in light of her history of pulmonary hypertension, hypertension may want to continue therapy.

## 2017-08-10 NOTE — TELEPHONE ENCOUNTER
Called and left a mssg for Kezia to return my call regarding which facility she wants Janet to go to for speech therapy.

## 2017-08-11 ENCOUNTER — PATIENT MESSAGE (OUTPATIENT)
Dept: PULMONOLOGY | Facility: HOSPICE | Age: 82
End: 2017-08-11

## 2017-08-11 ENCOUNTER — TELEPHONE (OUTPATIENT)
Dept: PULMONOLOGY | Facility: HOSPICE | Age: 82
End: 2017-08-11

## 2017-08-11 NOTE — TELEPHONE ENCOUNTER
I am trying to send a referral for speech therapy to [facility] to Juan Hays, but the number that the pt's granddaughter gave me for him is a bad number.  I have left a mssg at the hospital with a nurse who is supposed to have his information.

## 2017-08-14 ENCOUNTER — OFFICE VISIT (OUTPATIENT)
Dept: PULMONOLOGY | Facility: HOSPICE | Age: 82
End: 2017-08-14
Payer: MEDICARE

## 2017-08-14 VITALS
DIASTOLIC BLOOD PRESSURE: 78 MMHG | HEIGHT: 67 IN | SYSTOLIC BLOOD PRESSURE: 118 MMHG | OXYGEN SATURATION: 95 % | BODY MASS INDEX: 15.94 KG/M2 | HEART RATE: 101 BPM | RESPIRATION RATE: 16 BRPM | WEIGHT: 101.6 LBS

## 2017-08-14 DIAGNOSIS — Z87.01 H/O: PNEUMONIA: ICD-10-CM

## 2017-08-14 DIAGNOSIS — R09.02 HYPOXEMIA: ICD-10-CM

## 2017-08-14 DIAGNOSIS — R13.10 DYSPHAGIA, UNSPECIFIED TYPE: ICD-10-CM

## 2017-08-14 DIAGNOSIS — Z87.09 H/O PLEURAL EFFUSION: ICD-10-CM

## 2017-08-14 DIAGNOSIS — I27.20 PULMONARY HYPERTENSION (HCC): ICD-10-CM

## 2017-08-14 PROCEDURE — 99214 OFFICE O/P EST MOD 30 MIN: CPT | Performed by: NURSE PRACTITIONER

## 2017-08-14 NOTE — MR AVS SNAPSHOT
"        Janetjohnny Bartlett   2017 2:00 PM   Office Visit   MRN: [id number]    Department:  [facility]   Dept Phone:  [phone]    Description:  Female : 1927   Provider:  MARY Sanchez           Reason for Visit     Hypoxemia           Allergies as of 2017     Allergen Noted Reactions    Penicillins 2012   Hives      You were diagnosed with     Pulmonary hypertension (CMS-HCC)   [419531]       H/O: pneumonia   [395568]   with empyema, secondary to aspiration, resolved    H/O pleural effusion   [341341]   Resolved    Hypoxemia   [799.02.ICD-9-CM]       Dysphagia, unspecified type   [8264932]         Vital Signs     Blood Pressure Pulse Respirations Height Weight Body Mass Index    118/78 mmHg 101 16 1.702 m (5' 7.01\") 46.085 kg (101 lb 9.6 oz) 15.91 kg/m2    Oxygen Saturation Smoking Status                95% Never Smoker           Basic Information     Date Of Birth Sex Race Ethnicity Preferred Language    1927 Female White Non- English      Your appointments     Aug 21, 2017  2:40 PM   FOLLOW UP with Armani Arguelles M.D.   [facility]    [address] [address]   [phone]            2018 12:00 PM   Pulmonary Function Test with PFT-RM3   [facility] (--)    [address] [place] [address] [address]  [facility] [id number] [phone]            2018  1:20 PM   Established Patient Pul with DIANA SanchezPGUCCI   [facility] (--)    [address] [place] [address] [address]  [facility] [id number] [phone]              Problem List              ICD-10-CM Priority Class Noted - Resolved    LVH (left ventricular hypertrophy) I51.7 Medium  Unknown - Present    Hypercholesteremia E78.00 Low  Unknown - Present    Tricuspid regurgitation I07.1 Medium  Unknown - Present    Pulmonary hypertension (CMS-HCC) I27.2 Medium  Unknown - Present    Benign " hypertensive heart disease without heart failure I11.9 High  1/5/2012 - Present    Nonspecific abnormal electrocardiogram (ECG) (EKG) R94.31 Medium  3/11/2012 - Present    Vasovagal syncope R55 Medium  9/5/2013 - Present    Aortic dissection (CMS-HCC) I71.00 Low  4/24/2014 - Present    Pleural effusion J90 High  5/24/2016 - Present    Dysphagia R13.10 Medium  5/25/2016 - Present    Aspiration pneumonia (CMS-HCC) J69.0 High  5/25/2016 - Present    Essential hypertension, benign I10   12/22/2016 - Present    Abdominal aortic aneurysm (AAA) without rupture (CMS-HCC) I71.4   Unknown - Present    Hypoxemia R09.02   8/14/2017 - Present      Health Maintenance        Date Due Completion Dates    IMM DTaP/Tdap/Td Vaccine (1 - Tdap) 11/24/1946 ---    PAP SMEAR 11/24/1948 ---    MAMMOGRAM 11/24/1967 ---    COLONOSCOPY 11/24/1977 ---    IMM ZOSTER VACCINE 11/24/1987 ---    BONE DENSITY 11/24/1992 ---    IMM INFLUENZA (1) 9/1/2017 10/10/2016, 10/24/2013            Current Immunizations     13-VALENT PCV PREVNAR 1/1/2016    Influenza TIV (IM) 10/24/2013    Influenza Vaccine Adult HD 10/10/2016  1:37 PM    Pneumococcal polysaccharide vaccine (PPSV-23) 4/24/2009      Below and/or attached are the medications your provider expects you to take. Review all of your home medications and newly ordered medications with your provider and/or pharmacist. Follow medication instructions as directed by your provider and/or pharmacist. Please keep your medication list with you and share with your provider. Update the information when medications are discontinued, doses are changed, or new medications (including over-the-counter products) are added; and carry medication information at all times in the event of emergency situations     Allergies:  PENICILLINS - Hives               Medications  Valid as of: August 14, 2017 -  2:41 PM    Generic Name Brand Name Tablet Size Instructions for use    Acetaminophen (Tab) Acetaminophen 650  mg by  Per NG Tube route every 6 hours as needed (Mild Pain; (Pain scale 1-3); Temp greater than 100.5 F).        Aspirin (Chew Tab) ASA 81 MG 1 Tab by Per NG Tube route every day.        Ibuprofen (Cap) Ibuprofen 200 MG Take  by mouth.        .                 Medicines prescribed today were sent to:     [facility] [place] [address] [address] [facility] [address]    Phone: [phone] Fax: [phone]    Open 24 Hours?: No      Medication refill instructions:       If your prescription bottle indicates you have medication refills left, it is not necessary to call your provider’s office. Please contact your pharmacy and they will refill your medication.    If your prescription bottle indicates you do not have any refills left, you may request refills at any time through one of the following ways: The online [employer] system (except Urgent Care), by calling your provider’s office, or by asking your pharmacy to contact your provider’s office with a refill request. Medication refills are processed only during regular business hours and may not be available until the next business day. Your provider may request additional information or to have a follow-up visit with you prior to refilling your medication.   *Please Note: Medication refills are assigned a new Rx number when refilled electronically. Your pharmacy may indicate that no refills were authorized even though a new prescription for the same medication is available at the pharmacy. Please request the medicine by name with the pharmacy before contacting your provider for a refill.        Instructions      Plan:    1) Chest xray indicates no pneumonia. Pleural effusions largely resolved. Recurrent pneumonia has been felt to be secondary to aspiration. She is pending eval with Speech Pathology. She is working on finding someone that comes [facility] the [facility].   2) Reviewed overnight oximetry which indicates overall adequate 02  saturations. She feels she sleeps well at night and denies fatigue. Her daytime saturations are adequate. She would like to go off supplemental 02. Order to her DME to D/C home 02.   3) She declines need for inhalers.  4) Consider repeating Echo.  5) She is up to date on Prevnar 13 vaccines. Her last Pneumovax 23 per our records was in 2009. Recommend updated Influenza vaccine in the Fall as well as an updated Pneumovax 23 if not performed within the last 5-7 years.    6) Follow up in 6 months with Dr. Ko or myself. However, sooner visit if symptoms warrant.           [employer] Access Code: Activation code not generated  Current Servio Status: Active

## 2017-08-14 NOTE — PROGRESS NOTES
Chief Complaint   Patient presents with   • Hypoxemia       HPI:  Janet Bartlett is a 89 y.o. year old female here today for follow-up on her hypoxemia. She was seen in the office in May 2017 with Dr. Ko. She has a history of an aspiration pneumonia and empyema that required decortication in May 2016. Pulmonary function testing in October 2016 demonstrated an FEV1 of 1.51 L which is 72% of predicted. Her lung volumes demonstrated hyperinflation. DLCO was not done. CT scan in July 2016 showed some residual pleural thickening. She also has a history of pulmonary hypertension on an echocardiogram when she was hospitalized. She had a 6 minute walk test October 2016 which showed no evidence of oxygen desaturation. She is not on inhalers. She does have chronic hip pain.  She states she was again admitted to the hospital with pneumonia in [place]. She was hospitalized 6/16-6/19/2017. She was treated in hospital with antibiotics. She was also discharged on antibiotics which she completed, but is unsure of what antibiotic. She also sent home on 02 at 2 LPM. She was feeling much better at her last office visit 7/10/2017 and wanted to see if she could go off the oxygen. Multi oximetry at her last office visit indicated a low 02 of 91% and her daytime 02 was discontinued. CNOX was performed on RA through her DME 7/18/2017 and indicates a mean 02 saturation of 92.4%. She had a couple episodes of desaturations spending 9 minutes with saturations less than 88%. However, this may be due to artifact. She denies current cough or mucous. She denies hemoptysis. She denies wheezing. She continue to cough after drinking liquids. She was referred to Speech Pathology at her last office visit for further work up on this. She denies any fevers or chills. She notes mild dyspnea with exertion which she feels is unchanged. She does feels she sleeps well at night and wakes more refreshed. She does see Dr. Arguelles for Cardiology.     Repeat  chest xray 8/11/2017 performed at [facility] indicates; pleural effusions have largely resolved. No acute infiltrate identified.       Past Medical History   Diagnosis Date   • Hypertension    • Hypercholesteremia    • Mitral regurgitation    • Pulmonary hypertension (CMS-[facility])    • Aortic dissection (CMS-[facility])      Pool type B, healed as of last computed tomography scan in 2014.   • LVH (left ventricular hypertrophy) 2012     Anterior Q waves, probably due to LVH, normal wall motion and LVH confirmed by echo.    • Bronchiectasis (CMS-[facility])    • Empyema (CMS-[facility]) 5/2016     Left hemithorax, successfully drained and treated   • Aspiration pneumonia (CMS-[facility]) 5/2016   • Sepsis (CMS-[facility]) 5/2016   • Respiratory failure (CMS-[facility]) 5/2016   • Nonspecific abnormal electrocardiogram (ECG) (EKG)      See above, pattern stable.   • Abdominal aortic aneurysm (AAA) without rupture (CMS-[facility])      Small saccular aneurysm distal abdominal aorta        Past Surgical History   Procedure Laterality Date   • Other orthopedic surgery  open fracture reduction     Left arm   • Thoracotomy Left 5/27/2016     Procedure: THORACOTOMY Mini with;  Surgeon: Jose Lafleur M.D.;  Location: SURGERY [facility];  Service:    • Thoracoscopy Left 5/27/2016     Procedure: THORACOSCOPY;  Surgeon: Jose Lafleur M.D.;  Location: SURGERY [facility];  Service:    • Hip arthroplasty total Left      Prior to the above.       Family History   Problem Relation Age of Onset   • Heart Attack Mother    • Heart Attack Father    • Other Daughter      Multiple Sclerosis       Social History     Social History   • Marital Status:      Spouse Name: N/A   • Number of Children: N/A   • Years of Education: N/A     Occupational History   • Not on file.     Social History Main Topics   • Smoking status: Never Smoker    • Smokeless tobacco: Never Used   • Alcohol Use: No   • Drug Use: No   • Sexual Activity: Not on file     Other Topics  "Concern   • Not on file     Social History Narrative         ROS:  Constitutional: Denies fevers, chills, sweats, fatigue, weight loss  Eyes: Denies  vision loss, pain, drainage, double vision. Wears glasses   Ears/Nose/Mouth/Throat: Denies rhinitis, nasal congestion, ear ache, difficulty hearing, sore throat, persistent hoarseness, decayed teeth/toothache  Cardiovascular: Denies chest pain, tightness, palpitations, swelling in feet/legs, fainting, difficulty breathing when laying down  Respiratory: See HPI   GI: Denies heartburn, nausea, vomiting, abdominal pain, diarrhea, constipation. Positive for dysphagia   : Denies frequent urination, painful urination  Integumentary: Denies rashes, lumps or color changes  MSK: Denies painful joints, sore muscles, and back pain.   Neurological: Denies frequent headaches, dizziness, weakness        Current Outpatient Prescriptions on File Prior to Visit   Medication Sig Dispense Refill   • aspirin (ASA) 81 MG Chew Tab chewable tablet 1 Tab by Per NG Tube route every day. 100 Tab 11   • Ibuprofen (ADVIL) 200 MG Cap Take  by mouth.     • acetaminophen 650 MG Tab 650 mg by Per NG Tube route every 6 hours as needed (Mild Pain; (Pain scale 1-3); Temp greater than 100.5 F). (Patient not taking: Reported on 5/3/2017) 30 Tab 0     No current facility-administered medications on file prior to visit.     Penicillins    Blood pressure 118/78, pulse 101, resp. rate 16, height 1.702 m (5' 7.01\"), weight 46.085 kg (101 lb 9.6 oz), SpO2 95 %.  PE:   Appearance: Thin, elderly female, no acute distress  Eyes: PERRL, EOM intact, sclera white, conjunctiva moist  Ears: no lesions or deformities  Hearing: grossly intact  Nose: no lesions or deformities  Oropharynx: tongue normal, posterior pharynx without erythema or exudate  Neck: supple, trachea midline, no masses   Respiratory effort: no intercostal retractions or use of accessory muscles  Lung auscultation: no rales, rhonchi or wheezes  Heart " auscultation: no murmur rub or gallop  Extremities: no cyanosis or edema  Abdomen: soft ,non tender, no masses  Gait and Station: normal  Digits and nails: no clubbing, cyanosis, petechiae or nodes.  Cranial nerves: grossly intact  Skin: no rashes, lesions or ulcers noted  Orientation: Oriented to time, person and place  Mood and affect: mood and affect appropriate, normal interaction with examiner  Judgement: Intact          Assessment:  1. Pulmonary hypertension (CMS-HCC)     2. H/O: pneumonia      with empyema, secondary to aspiration, resolved   3. H/O pleural effusion      Resolved   4. Hypoxemia  DME OTHER   5. Dysphagia, unspecified type           Plan:    1) Chest xray indicates no pneumonia. Pleural effusions largely resolved. Recurrent pneumonia has been felt to be secondary to aspiration. She is pending eval with Speech Pathology. She is working on finding someone that comes to the [facility].   2) Reviewed overnight oximetry which indicates overall adequate 02 saturations. She feels she sleeps well at night and denies fatigue. Her daytime saturations are adequate. She would like to go off supplemental 02. Order to her DME to D/C home 02.   3) She declines need for inhalers.  4) Consider repeating Echo.  5) She is up to date on Prevnar 13 vaccines. Her last Pneumovax 23 per our records was in 2009. Recommend updated Influenza vaccine in the Fall as well as an updated Pneumovax 23 if not performed within the last 5-7 years.    6) Follow up in 6 months with Dr. Ko or myself. However, sooner visit if symptoms warrant.

## 2017-08-14 NOTE — PATIENT INSTRUCTIONS
Plan:    1) Chest xray indicates no pneumonia. Pleural effusions largely resolved. Recurrent pneumonia has been felt to be secondary to aspiration. She is pending eval with Speech Pathology. She is working on finding someone that comes to the [facility].   2) Reviewed overnight oximetry which indicates overall adequate 02 saturations. She feels she sleeps well at night and denies fatigue. Her daytime saturations are adequate. She would like to go off supplemental 02. Order to her DME to D/C home 02.   3) She declines need for inhalers.  4) Consider repeating Echo.  5) She is up to date on Prevnar 13 vaccines. Her last Pneumovax 23 per our records was in 2009. Recommend updated Influenza vaccine in the Fall as well as an updated Pneumovax 23 if not performed within the last 5-7 years.    6) Follow up in 6 months with Dr. Ko or myself. However, sooner visit if symptoms warrant.

## 2017-08-22 ENCOUNTER — TELEPHONE (OUTPATIENT)
Dept: PULMONOLOGY | Facility: HOSPICE | Age: 82
End: 2017-08-22

## 2017-08-22 DIAGNOSIS — T17.908A ASPIRATION INTO AIRWAY, INITIAL ENCOUNTER: ICD-10-CM

## 2017-09-07 ENCOUNTER — TELEPHONE (OUTPATIENT)
Dept: PULMONOLOGY | Facility: HOSPICE | Age: 82
End: 2017-09-07

## 2017-09-07 DIAGNOSIS — R13.10 DYSPHAGIA, UNSPECIFIED(787.20): ICD-10-CM

## 2017-09-07 DIAGNOSIS — J69.0 RECURRENT ASPIRATION PNEUMONIA (HCC): ICD-10-CM

## 2017-10-12 ENCOUNTER — OFFICE VISIT (OUTPATIENT)
Dept: CARDIOLOGY | Facility: PHYSICIAN GROUP | Age: 82
End: 2017-10-12
Payer: MEDICARE

## 2017-10-12 VITALS
DIASTOLIC BLOOD PRESSURE: 80 MMHG | WEIGHT: 98 LBS | HEIGHT: 68 IN | BODY MASS INDEX: 14.85 KG/M2 | SYSTOLIC BLOOD PRESSURE: 130 MMHG | OXYGEN SATURATION: 92 % | HEART RATE: 91 BPM

## 2017-10-12 DIAGNOSIS — I11.9 BENIGN HYPERTENSIVE HEART DISEASE WITHOUT HEART FAILURE: ICD-10-CM

## 2017-10-12 DIAGNOSIS — I10 ESSENTIAL HYPERTENSION, BENIGN: ICD-10-CM

## 2017-10-12 DIAGNOSIS — I71.40 ABDOMINAL AORTIC ANEURYSM (AAA) WITHOUT RUPTURE (HCC): ICD-10-CM

## 2017-10-12 DIAGNOSIS — I71.019 DISSECTION OF THORACIC AORTA (HCC): ICD-10-CM

## 2017-10-12 PROBLEM — R09.02 HYPOXEMIA: Status: RESOLVED | Noted: 2017-08-14 | Resolved: 2017-10-12

## 2017-10-12 PROCEDURE — 99214 OFFICE O/P EST MOD 30 MIN: CPT | Performed by: NURSE PRACTITIONER

## 2017-10-12 ASSESSMENT — ENCOUNTER SYMPTOMS
PND: 0
LOSS OF CONSCIOUSNESS: 0
DIZZINESS: 0
MYALGIAS: 0
FEVER: 0
COUGH: 0
ORTHOPNEA: 0
SHORTNESS OF BREATH: 0
BRUISES/BLEEDS EASILY: 0
PALPITATIONS: 0
CHILLS: 0
ABDOMINAL PAIN: 0

## 2017-10-12 NOTE — LETTER
[facility] [address]  Phone: [phone]  Fax: [phone]              Janet Bartlett  11/24/1927    Encounter Date: 10/12/2017    AURELIA Adler.          PROGRESS NOTE:  No notes on file      No Recipients

## 2017-10-12 NOTE — PROGRESS NOTES
Subjective:   Janet Bartlett is a 89 y.o. female who presents today for six month FU of hypertension, history of abdominal aortic aneurysm and aortic dissection.    Janet is an 89 year old female with history of hypertension, abdominal aortic aneurysm and history of type B aortic dissection, health on CT scan in 2014. She is not currently on any medication for BP, and is doing well. Since the last visit 6 months ago, she has been stable: no chest pain, pressure or discomfort; no palpitations; no shortness of breath, orthopnea or PND; no dizziness or syncope; no edema. She does get occasional cramps in her feet, but otherwise remains active.    Past Medical History:   Diagnosis Date   • Empyema (CMS-HCC) 5/2016    Left hemithorax, successfully drained and treated   • Aspiration pneumonia (CMS-HCC) 5/2016   • Sepsis (CMS-HCC) 5/2016   • Respiratory failure (CMS-HCC) 5/2016   • LVH (left ventricular hypertrophy) 2012    Anterior Q waves, probably due to LVH, normal wall motion and LVH confirmed by echo.    • Abdominal aortic aneurysm (AAA) without rupture (CMS-HCC)     Small saccular aneurysm distal abdominal aorta    • Aortic dissection ([facility])     [place] type B, healed as of last computed tomography scan in 2014.   • Bronchiectasis (CMS-[facility])    • Hypercholesteremia    • Hypertension    • Mitral regurgitation    • Nonspecific abnormal electrocardiogram (ECG) (EKG)     See above, pattern stable.   • Pulmonary hypertension      Past Surgical History:   Procedure Laterality Date   • THORACOTOMY Left 5/27/2016    Procedure: THORACOTOMY Mini with;  Surgeon: Jose Lafleur M.D.;  Location: SURGERY [facility];  Service:    • THORACOSCOPY Left 5/27/2016    Procedure: THORACOSCOPY;  Surgeon: Jose Lafleur M.D.;  Location: SURGERY [facility];  Service:    • HIP ARTHROPLASTY TOTAL Left     Prior to the above.   • OTHER ORTHOPEDIC SURGERY  open fracture reduction    Left arm     Family History   Problem Relation  "Age of Onset   • Heart Attack Mother    • Heart Attack Father    • Other Daughter      Multiple Sclerosis     History   Smoking Status   • Never Smoker   Smokeless Tobacco   • Never Used     Allergies   Allergen Reactions   • Penicillins Hives     Outpatient Encounter Prescriptions as of 10/12/2017   Medication Sig Dispense Refill   • Ibuprofen (ADVIL) 200 MG Cap Take  by mouth.     • acetaminophen 650 MG Tab 650 mg by Per NG Tube route every 6 hours as needed (Mild Pain; (Pain scale 1-3); Temp greater than 100.5 F). 30 Tab 0   • aspirin (ASA) 81 MG Chew Tab chewable tablet 1 Tab by Per NG Tube route every day. 100 Tab 11     No facility-administered encounter medications on file as of 10/12/2017.      Review of Systems   Constitutional: Negative for chills, fever and malaise/fatigue.   HENT: Negative for congestion.    Respiratory: Negative for cough and shortness of breath.    Cardiovascular: Negative for chest pain, palpitations, orthopnea, leg swelling and PND.   Gastrointestinal: Negative for abdominal pain.   Musculoskeletal: Negative for myalgias.   Neurological: Negative for dizziness and loss of consciousness.   Endo/Heme/Allergies: Does not bruise/bleed easily.        Objective:   Pulse 91   Ht 1.727 m (5' 8\")   Wt 44.5 kg (98 lb)   SpO2 92%   BMI 14.90 kg/m²     Physical Exam   Constitutional: She is oriented to person, place, and time. She appears well-developed and well-nourished. No distress.   Petite, thin   HENT:   Head: Normocephalic.   Eyes: Conjunctivae and EOM are normal.   Neck: Normal range of motion. Neck supple. No JVD present.   Cardiovascular: Normal rate, regular rhythm and intact distal pulses.  Exam reveals no gallop and no friction rub.    Murmur heard.   Systolic murmur is present with a grade of 2/6   Pulmonary/Chest: Effort normal and breath sounds normal.   Abdominal: Soft. Bowel sounds are normal.   Musculoskeletal: Normal range of motion. She exhibits no edema.   Neurological: " She is alert and oriented to person, place, and time.   Skin: Skin is warm and dry.   Psychiatric: She has a normal mood and affect. Her behavior is normal.     No recent labwork done.    Assessment:     1. Essential hypertension, benign     2. Benign hypertensive heart disease without heart failure     3. Abdominal aortic aneurysm (AAA) without rupture (CMS-HCC)     4. Dissection of thoracic aorta (CMS-HCC)         Medical Decision Making:  Today's Assessment / Status / Plan:     1. Hypertension, not currently on any therapy. BP is good and stable.    2. History of abdominal aortic aneurysm, stable.    3. History of thoracic aorta dissection, healed on CT scan in 2014.    She remains stable on ASA and PRN Tylenol. To FU with us in 6 months, sooner if clinical condition changes.    Collaborating MD: Bobbi

## 2018-01-09 ENCOUNTER — TELEPHONE (OUTPATIENT)
Dept: PULMONOLOGY | Facility: HOSPICE | Age: 83
End: 2018-01-09

## 2018-01-09 DIAGNOSIS — R06.00 DYSPNEA, UNSPECIFIED TYPE: ICD-10-CM

## 2018-01-30 ENCOUNTER — HOSPITAL ENCOUNTER (OUTPATIENT)
Dept: RADIOLOGY | Facility: MEDICAL CENTER | Age: 83
End: 2018-01-30

## 2018-02-07 ENCOUNTER — NON-PROVIDER VISIT (OUTPATIENT)
Dept: PULMONOLOGY | Facility: HOSPICE | Age: 83
End: 2018-02-07
Payer: MEDICARE

## 2018-02-07 ENCOUNTER — OFFICE VISIT (OUTPATIENT)
Dept: PULMONOLOGY | Facility: HOSPICE | Age: 83
End: 2018-02-07
Payer: MEDICARE

## 2018-02-07 VITALS
RESPIRATION RATE: 16 BRPM | DIASTOLIC BLOOD PRESSURE: 80 MMHG | HEIGHT: 68 IN | WEIGHT: 99 LBS | SYSTOLIC BLOOD PRESSURE: 120 MMHG | BODY MASS INDEX: 15.01 KG/M2 | HEART RATE: 84 BPM | OXYGEN SATURATION: 93 % | TEMPERATURE: 98.8 F

## 2018-02-07 DIAGNOSIS — Z23 NEED FOR PNEUMOCOCCAL VACCINATION: ICD-10-CM

## 2018-02-07 DIAGNOSIS — R06.00 DYSPNEA, UNSPECIFIED TYPE: ICD-10-CM

## 2018-02-07 DIAGNOSIS — R09.02 HYPOXEMIA: ICD-10-CM

## 2018-02-07 DIAGNOSIS — I27.20 PULMONARY HYPERTENSION (HCC): ICD-10-CM

## 2018-02-07 DIAGNOSIS — Z87.01 H/O: PNEUMONIA: ICD-10-CM

## 2018-02-07 DIAGNOSIS — Z87.09 H/O PLEURAL EFFUSION: ICD-10-CM

## 2018-02-07 DIAGNOSIS — R13.10 DYSPHAGIA, UNSPECIFIED TYPE: ICD-10-CM

## 2018-02-07 DIAGNOSIS — J44.9 CHRONIC OBSTRUCTIVE PULMONARY DISEASE, UNSPECIFIED COPD TYPE (HCC): ICD-10-CM

## 2018-02-07 PROCEDURE — 99214 OFFICE O/P EST MOD 30 MIN: CPT | Performed by: NURSE PRACTITIONER

## 2018-02-07 PROCEDURE — 94726 PLETHYSMOGRAPHY LUNG VOLUMES: CPT | Performed by: INTERNAL MEDICINE

## 2018-02-07 PROCEDURE — 94060 EVALUATION OF WHEEZING: CPT | Performed by: INTERNAL MEDICINE

## 2018-02-07 PROCEDURE — 94729 DIFFUSING CAPACITY: CPT | Performed by: INTERNAL MEDICINE

## 2018-02-07 ASSESSMENT — PULMONARY FUNCTION TESTS
FEV1/FVC_PERCENT_PREDICTED: 74
FEV1/FVC_PERCENT_PREDICTED: 74
FEV1/FVC: 54.46
FEV1_PERCENT_PREDICTED: 60
FEV1_PERCENT_CHANGE: 3
FEV1_PERCENT_PREDICTED: 62
FEV1/FVC_PERCENT_CHANGE: 150
FVC_LLN: 2
FEV1/FVC: 54
FVC: 2.02
FEV1: 1.1
FEV1/FVC_PERCENT_CHANGE: 1
FVC_PREDICTED: 2.39
FVC_PERCENT_PREDICTED: 82
FEV1/FVC_PERCENT_PREDICTED: 73
FEV1_LLN: 1.47
FEV1_PREDICTED: 1.76
FEV1/FVC: 54
FVC: 1.97
FVC_PERCENT_PREDICTED: 84
FEV1/FVC_PERCENT_PREDICTED: 75
FEV1: 1.06
FEV1/FVC_PERCENT_PREDICTED: 74
FEV1/FVC_PREDICTED: 72
FEV1/FVC_PERCENT_LLN: 60
FEV1_PERCENT_CHANGE: 2
FEV1/FVC: 54

## 2018-02-07 NOTE — PROGRESS NOTES
Chief Complaint   Patient presents with   • Hypoxemia     PFT       HPI:  Janet Bartlett is a 89 y.o. year old female here today for follow-up on her hypoxemia. She was previously seen in our office with Dr. Ko. She has a history of an aspiration pneumonia and empyema that required decortication in May 2016. Pulmonary function testing in October 2016 demonstrated an FEV1 of 1.51 L which is 72% of predicted. Her lung volumes demonstrated hyperinflation. DLCO was not done. CT scan in July 2016 showed some residual pleural thickening. She also has a history of pulmonary hypertension on an echocardiogram when she was hospitalized. She had a 6 minute walk test October 2016 which showed no evidence of oxygen desaturation. She is not on inhalers. She does have chronic hip pain.    She states she was again admitted to the hospital with pneumonia in [place]. She was hospitalized 6/16-6/19/2017. She was treated in hospital with antibiotics. She was also discharged on antibiotics which she completed, but is unsure of what antibiotic. She also sent home on 02 at 2 LPM. She was feeling much better at her last office visit 7/10/2017 and wanted to see if she could go off the oxygen. Multi oximetry indicated a low 02 of 91% and her daytime 02 was discontinued. CNOX was performed on RA through her DME 7/18/2017 and indicates a mean 02 saturation of 92.4%. She had a couple episodes of desaturations spending 9 minutes with saturations less than 88%. However, this may be due to artifact.     She continued to c/o cough after drinking liquids. She was referred to Speech Pathology for further work up on this. She saw Speech Pathology in [place] who did not feel she was aspirating. She states she was given exercises to do at home.     She does see Dr. Arguelles for Cardiology.      Repeat chest xray 8/11/2017 performed at [facility] indicates; pleural effusions have largely resolved. No acute infiltrate identified.      She was recently hospitalized in [place] for Influenza. She was treated with TamiFlu and antibiotics.     She feels her cough has improved. She denies current mucous production. She denies current wheezing. She feels her dyspnea has improved. However she still experiencing dyspnea with exertion. She denies current fevers or chills. She states she was restarted on 02 at 2 LPM nocturnally. She states she did a repeat oximetry on RA through [facility]. Her resting RA saturation is 93%.     PFT's were updated today in the office and indicate an FEV1 of 1.06 L, 60% predicted with an FEV1/FVC ratio of 54, TLC of 99% with a DLCO of 69% predicted.          Past Medical History:   Diagnosis Date   • Abdominal aortic aneurysm (AAA) without rupture (CMS-[facility])     Small saccular aneurysm distal abdominal aorta    • Aortic dissection (CMS-[facility])     [place] type B, healed as of last computed tomography scan in 2014.   • Aspiration pneumonia (CMS-[facility]) 5/2016   • Bronchiectasis (CMS-HCC)    • Empyema (CMS-[facility]) 5/2016    Left hemithorax, successfully drained and treated   • Hypercholesteremia    • Hypertension    • LVH (left ventricular hypertrophy) 2012    Anterior Q waves, probably due to LVH, normal wall motion and LVH confirmed by echo.    • Mitral regurgitation    • Nonspecific abnormal electrocardiogram (ECG) (EKG)     See above, pattern stable.   • Pulmonary hypertension    • Respiratory failure (CMS-[facility]) 5/2016   • Sepsis (CMS-[facility]) 5/2016       Past Surgical History:   Procedure Laterality Date   • THORACOTOMY Left 5/27/2016    Procedure: THORACOTOMY Mini with;  Surgeon: Jose Lafleur M.D.;  Location: SURGERY [facility];  Service:    • THORACOSCOPY Left 5/27/2016    Procedure: THORACOSCOPY;  Surgeon: Jose Lafleur M.D.;  Location: SURGERY [facility];  Service:    • HIP ARTHROPLASTY TOTAL Left     Prior to the above.   • OTHER ORTHOPEDIC SURGERY  open fracture reduction    Left arm       Family History  "  Problem Relation Age of Onset   • Heart Attack Mother    • Heart Attack Father    • Other Daughter      Multiple Sclerosis       Social History     Social History   • Marital status:      Spouse name: N/A   • Number of children: N/A   • Years of education: N/A     Occupational History   • Not on file.     Social History Main Topics   • Smoking status: Never Smoker   • Smokeless tobacco: Never Used   • Alcohol use No   • Drug use: No   • Sexual activity: Not on file     Other Topics Concern   • Not on file     Social History Narrative   • No narrative on file         ROS:  Constitutional: Denies fevers, chills, sweats, fatigue, weight loss  Eyes: Denies vision loss, pain, drainage, double vision. Wears glasses  Ears/Nose/Mouth/Throat: Denies rhinitis, nasal congestion, ear ache, difficulty hearing, sore throat, persistent hoarseness, decayed teeth/toothache  Cardiovascular: Denies chest pain, tightness, palpitations, swelling in feet/legs, fainting, difficulty breathing when laying down  Respiratory: See HPI   GI: Denies heartburn, nausea, vomiting, abdominal pain, diarrhea, constipation  : Denies frequent urination, painful urination  Integumentary: Denies rashes, lumps or color changes  MSK: Positive for hip pain   Neurological: Denies frequent headaches, dizziness, weakness        Current Outpatient Prescriptions   Medication Sig Dispense Refill   • Ibuprofen (ADVIL) 200 MG Cap Take  by mouth.     • acetaminophen 650 MG Tab 650 mg by Per NG Tube route every 6 hours as needed (Mild Pain; (Pain scale 1-3); Temp greater than 100.5 F). 30 Tab 0   • aspirin (ASA) 81 MG Chew Tab chewable tablet 1 Tab by Per NG Tube route every day. 100 Tab 11     No current facility-administered medications for this visit.        Allergies   Allergen Reactions   • Penicillins Hives       Blood pressure 120/80, pulse 84, temperature 37.1 °C (98.8 °F), resp. rate 16, height 1.727 m (5' 8\"), weight 44.9 kg (99 lb), SpO2 93 " %.    PE:   Appearance: Well developed, well nourished, no acute distress  Eyes: PERRL, EOM intact, sclera white, conjunctiva moist  Ears: no lesions or deformities  Hearing: grossly intact  Nose: no lesions or deformities  Oropharynx: tongue normal, posterior pharynx without erythema or exudate  Mallampati Classification: class 3  Neck: supple, trachea midline, no masses   Respiratory effort: no intercostal retractions or use of accessory muscles  Lung auscultation: no rales, rhonchi or wheezes, diminished   Heart auscultation: no murmur rub or gallop  Extremities: no cyanosis. Compression stockings bilaterally   Abdomen: soft ,non tender, no masses  Gait and Station: ambulates with cane  Digits and nails: no clubbing, cyanosis, petechiae or nodes.  Cranial nerves: grossly intact  Skin: no rashes, lesions or ulcers noted  Orientation: Oriented to time, person and place  Mood and affect: mood and affect appropriate, normal interaction with examiner  Judgement: Intact          Assessment:  1. Chronic obstructive pulmonary disease, unspecified COPD type (CMS-HCC)     2. Pulmonary hypertension     3. H/O: pneumonia      with empyema, secondary to aspiration, resolved   4. H/O pleural effusion      Resolved   5. Hypoxemia     6. Dysphagia, unspecified type           Plan:    1) PFT's indicated evidence of COPD. Trial of Anoro 1 dose INH daily. Encouraged routine walking and prompt treatment of acute infectious symptoms.   2) She is up to date on Prevnar 13 and Influenza vaccinations. Recommend updated Pneumovax 23. She believes she has had it more recently and will check with her PCP.   3) Request recent HVS records from [place].  4) Request copy of overnight oximetry performed through [facility]. In the interim she is encouraged to continue 02 at 2 LPM.   5) Follow up in 2 months to discuss response to Anoro, sooner for acute symptoms.

## 2018-02-07 NOTE — PROCEDURES
Good patient effort & cooperation.  The results of this test meet the ATS/ERS standards for acceptability and repeatability.  Predicted equations for Spirometry are N-Lizzie II, ITS for lung volumes, and [facility] for DLCO.  The DLCO was uncorrected for Hgb.  A bronchodilator of Xopenex HFA- 2puffs via spacer were administered.  DLCO was not  Performed.    SPIROMETRY:  1. FVC was 2.02 L, 84 % of predicted  2. FEV1 was 1.1 L, 62 % of predicted   3. FEV1/FVC ratio was 54 %  4. There was no significant response to bronchodilators   5. Flow volume loop was consistent with obstruction    LUNG VOLUMES:  1. RV was  132 % of predicted   2. TLC was 99 % of predicted       DIFFUSION CAPACITY:  1.Diffusion capacity was  69% of predicted       IMPRESSION:  The patient has moderate to severe obstructive ventilatory defect with FEV1 of 60% of predicted and FEV1/FVC ratio 54%. The patient also has moderate air trapping which is consistent with obstruction. Diffusion capacity was moderately decreased to 69% of predicted. Clinical correlation is required.

## 2018-02-07 NOTE — PATIENT INSTRUCTIONS
Plan:    1) PFT's indicated evidence of COPD. Trial of Anoro 1 dose INH daily. Encouraged routine walking and prompt treatment of acute infectious symptoms.   2) She is up to date on Prevnar 13 and Influenza vaccinations. Recommend updated Pneumovax 23. She believes she has had it more recently and will check with her PCP.   3) Request recent HVS records from [place].  4) Request copy of overnight oximetry performed through [facility]. In the interim she is encouraged to continue 02 at 2 LPM.   5) Follow up in 2 months to discuss response to Anoro, sooner for acute symptoms.

## 2018-04-16 ENCOUNTER — TELEPHONE (OUTPATIENT)
Dept: PULMONOLOGY | Facility: HOSPICE | Age: 83
End: 2018-04-16

## 2018-04-16 DIAGNOSIS — J44.9 CHRONIC OBSTRUCTIVE PULMONARY DISEASE, UNSPECIFIED COPD TYPE (HCC): ICD-10-CM

## 2018-04-16 NOTE — TELEPHONE ENCOUNTER
Karolyn from [facility] called and wants to change the patient's med Anoro to either Brovana or Perforomist or Ipratropium neb soln.  She states that the pt cannot afford the Anoro and needs to be changed over to nebulizer meds.  Please advise, thank you.

## 2018-04-17 ENCOUNTER — TELEPHONE (OUTPATIENT)
Dept: PULMONOLOGY | Facility: HOSPICE | Age: 83
End: 2018-04-17

## 2018-04-17 RX ORDER — BUDESONIDE AND FORMOTEROL FUMARATE DIHYDRATE 80; 4.5 UG/1; UG/1
2 AEROSOL RESPIRATORY (INHALATION) 2 TIMES DAILY
Qty: 1 INHALER | Refills: 11 | Status: ON HOLD | OUTPATIENT
Start: 2018-04-17 | End: 2018-08-02

## 2018-04-17 RX ORDER — BUDESONIDE AND FORMOTEROL FUMARATE DIHYDRATE 80; 4.5 UG/1; UG/1
2 AEROSOL RESPIRATORY (INHALATION) 2 TIMES DAILY
Qty: 2 INHALER | Refills: 0 | Status: SHIPPED | OUTPATIENT
Start: 2018-04-17 | End: 2018-04-17 | Stop reason: SDUPTHER

## 2018-04-17 NOTE — TELEPHONE ENCOUNTER
Janet does not have pharmacy coverage.  She depends on coupons and discount cards.  [facility] suggested the nebulizer meds, but it doesn't sound like that is the way she wants to go.  Do you want me to send her an Anoro coupon?  She said that the Anoro worked ok, that she really couldn't tell a difference.  She stated that she wants to go on Symbicort.  Please advise, thank you.

## 2018-04-17 NOTE — TELEPHONE ENCOUNTER
pharmacy called and asked for an up-date about the patient. Informed them about the previus note and they said they would contact the patient

## 2018-04-17 NOTE — TELEPHONE ENCOUNTER
I would prefer to have her on Anoro or Stiolto. Can we check with her pharmacy to see which inhaler is preferred or if she needs a prior auth? Also check with pt to see if she is requesting the change or if [facility] is recommending it. Thanks.

## 2018-04-18 NOTE — TELEPHONE ENCOUNTER
The coupon will not work with her insurance. We can switch her to Symbicort. We can offer her samples through the sample pharmacy and we may be able to help her with assistance forms from the pharmaceutical company. I sent a sample RX to the sample pharmacy as well as an RX to her local pharmacy. Please let her know and see if we have assistance forms for [place]. We may also have a coupon for 1 month of Symbicort that is able to work with Medicare insurance.   Remind her not to use the Anoro when she starts on Symbicort.

## 2018-04-18 NOTE — TELEPHONE ENCOUNTER
I spoke to the patient and let her know that we sent Symbicort to her pharmacy and to the [facility] and to not use the Anoro while using the Symbicort.  I also let her know that I have filled out most of an application for [place] for assistance for the Symbicort and that I will mail that to her today.  I told her to call with any questions about the application.  She understood everything I told her.

## 2018-04-19 ENCOUNTER — OFFICE VISIT (OUTPATIENT)
Dept: CARDIOLOGY | Facility: PHYSICIAN GROUP | Age: 83
End: 2018-04-19
Payer: MEDICARE

## 2018-04-19 ENCOUNTER — TELEPHONE (OUTPATIENT)
Dept: PULMONOLOGY | Facility: HOSPICE | Age: 83
End: 2018-04-19

## 2018-04-19 VITALS
HEART RATE: 96 BPM | WEIGHT: 105 LBS | BODY MASS INDEX: 15.91 KG/M2 | SYSTOLIC BLOOD PRESSURE: 140 MMHG | HEIGHT: 68 IN | DIASTOLIC BLOOD PRESSURE: 82 MMHG | OXYGEN SATURATION: 92 %

## 2018-04-19 DIAGNOSIS — E78.00 HYPERCHOLESTEREMIA: ICD-10-CM

## 2018-04-19 DIAGNOSIS — R00.0 SINUS TACHYCARDIA: ICD-10-CM

## 2018-04-19 DIAGNOSIS — I10 ESSENTIAL HYPERTENSION, BENIGN: ICD-10-CM

## 2018-04-19 DIAGNOSIS — I71.019 DISSECTION OF THORACIC AORTA (HCC): ICD-10-CM

## 2018-04-19 DIAGNOSIS — I71.40 ABDOMINAL AORTIC ANEURYSM (AAA) WITHOUT RUPTURE (HCC): ICD-10-CM

## 2018-04-19 PROCEDURE — 99214 OFFICE O/P EST MOD 30 MIN: CPT | Performed by: NURSE PRACTITIONER

## 2018-04-19 RX ORDER — METOPROLOL SUCCINATE 25 MG/1
12.5 TABLET, EXTENDED RELEASE ORAL DAILY
Qty: 15 TAB | Refills: 6 | Status: SHIPPED | OUTPATIENT
Start: 2018-04-19

## 2018-04-19 ASSESSMENT — ENCOUNTER SYMPTOMS
ORTHOPNEA: 0
COUGH: 0
PALPITATIONS: 0
CHILLS: 0
FEVER: 0
MYALGIAS: 0
ABDOMINAL PAIN: 0
DIZZINESS: 0
PND: 0
LOSS OF CONSCIOUSNESS: 0
BRUISES/BLEEDS EASILY: 0
SHORTNESS OF BREATH: 0

## 2018-04-19 NOTE — LETTER
[facility] [address]  Phone: [phone]  Fax: [phone]              Janet Bartlett  11/24/1927    Encounter Date: 4/19/2018    AURELIA Adler.          PROGRESS NOTE:  No notes on file      No Recipients

## 2018-04-19 NOTE — PROGRESS NOTES
Chief Complaint   Patient presents with   • Follow-Up   • HTN (Controlled)   • Hyperlipidemia       Subjective:   Janet Bartlett is a 89 y.o. female who presents today for six month follow-up for history of aortic aneurysm, hypertension and hyperlipidemia.    Janet is an 88 year female with history of aortic dissection (managed medically and healed on CT angiogram in 2014), hypertension, and hyperlipidemia, previously followed by Dr. Arguelles.    Last summer she had a left hip replacement; she was told, unless it was absolutely necessary, she should not have the right one done, due to age and risk.  She continues to get injections in her right hip.    From a cardiac standpoint, she is stable. No chest pain, pressure or discomfort; no palpitations; breathing is stable, and she uses oxygen at 0.5L.minute. No orthopnea or PND. No dizziness or syncope; no edema.      Past Medical History:   Diagnosis Date   • Abdominal aortic aneurysm (AAA) without rupture (CMS-[facility])     Small saccular aneurysm distal abdominal aorta    • Aortic dissection (CMS-[facility])     [place] type B, healed as of last computed tomography scan in 2014.   • Aspiration pneumonia (CMS-[facility]) 5/2016   • Bronchiectasis (CMS-[facility])    • Empyema (CMS-[facility]) 5/2016    Left hemithorax, successfully drained and treated   • Hypercholesteremia    • Hypertension    • LVH (left ventricular hypertrophy) 2012    Anterior Q waves, probably due to LVH, normal wall motion and LVH confirmed by echo.    • Mitral regurgitation    • Nonspecific abnormal electrocardiogram (ECG) (EKG)     See above, pattern stable.   • Pulmonary hypertension    • Respiratory failure (CMS-[facility]) 5/2016   • Sepsis (CMS-[facility]) 5/2016     Past Surgical History:   Procedure Laterality Date   • THORACOTOMY Left 5/27/2016    Procedure: THORACOTOMY Mini with;  Surgeon: Jose Lafleur M.D.;  Location: SURGERY [facility];  Service:    • THORACOSCOPY Left 5/27/2016    Procedure: THORACOSCOPY;  Surgeon:  Jose Lafleur M.D.;  Location: SURGERY [facility];  Service:    • HIP ARTHROPLASTY TOTAL Left     Prior to the above.   • OTHER ORTHOPEDIC SURGERY  open fracture reduction    Left arm     Family History   Problem Relation Age of Onset   • Heart Attack Mother    • Heart Attack Father    • Other Daughter      Multiple Sclerosis     Social History     Social History   • Marital status:      Spouse name: N/A   • Number of children: N/A   • Years of education: N/A     Occupational History   • Not on file.     Social History Main Topics   • Smoking status: Never Smoker   • Smokeless tobacco: Never Used   • Alcohol use No   • Drug use: No   • Sexual activity: Not on file     Other Topics Concern   • Not on file     Social History Narrative   • No narrative on file     Allergies   Allergen Reactions   • Penicillins Hives     Outpatient Encounter Prescriptions as of 4/19/2018   Medication Sig Dispense Refill   • aspirin (ASA) 81 MG Chew Tab chewable tablet 1 Tab by Per NG Tube route every day. 100 Tab 11   • budesonide-formoterol (SYMBICORT) 80-4.5 MCG/ACT Aerosol Inhale 2 Puffs by mouth 2 Times a Day. Use spacer. Rinse mouth after each use. 1 Inhaler 11   • [DISCONTINUED] umeclidinium-vilanterol (ANORO ELLIPTA) 62.5-25 MCG/INH AEROSOL POWDER, BREATH ACTIVATED inhaler Inhale 1 Puff by mouth every day. (Patient not taking: Reported on 4/19/2018) 1 Each 11   • Ibuprofen (ADVIL) 200 MG Cap Take  by mouth.     • acetaminophen 650 MG Tab 650 mg by Per NG Tube route every 6 hours as needed (Mild Pain; (Pain scale 1-3); Temp greater than 100.5 F). 30 Tab 0     No facility-administered encounter medications on file as of 4/19/2018.      Review of Systems   Constitutional: Negative for chills, fever and malaise/fatigue.   HENT: Negative for congestion.    Respiratory: Negative for cough and shortness of breath.    Cardiovascular: Negative for chest pain, palpitations, orthopnea, leg swelling and PND.   Gastrointestinal:  "Negative for abdominal pain.   Musculoskeletal: Negative for myalgias.   Neurological: Negative for dizziness and loss of consciousness.   Endo/Heme/Allergies: Does not bruise/bleed easily.        Objective:   /82   Pulse 96   Ht 1.727 m (5' 8\")   Wt 47.6 kg (105 lb)   SpO2 92%   BMI 15.97 kg/m²     Physical Exam   Constitutional: She is oriented to person, place, and time. She appears well-developed and well-nourished.   Petite, thin.  Using oxygen via nasal canula at 0.5L/minute.   HENT:   Head: Normocephalic.   Eyes: EOM are normal.   Neck: Normal range of motion. Neck supple. No JVD present.   Cardiovascular: Normal rate and regular rhythm.    Murmur heard.   Systolic murmur is present with a grade of 2/6   HR 95-100bpm   Pulmonary/Chest: Effort normal and breath sounds normal. No respiratory distress. She has no wheezes. She has no rales.   Abdominal: Soft. Bowel sounds are normal. She exhibits no distension. There is no tenderness.   Musculoskeletal: Normal range of motion. She exhibits no edema.   Neurological: She is alert and oriented to person, place, and time.   Skin: Skin is warm and dry. No rash noted.   Psychiatric: She has a normal mood and affect.     No recent labwork done.    EKG today reveals sinus rhythm at 90-100bpm with PVCs.    Assessment:     1. Sinus tachycardia  [facility] EKG (Clinic Performed)   2. Dissection of thoracic aorta (CMS-HCC)     3. Abdominal aortic aneurysm (AAA) without rupture (CMS-HCC)     4. Essential hypertension, benign     5. Hypercholesteremia         Medical Decision Making:  Today's Assessment / Status / Plan:     1. Sinus tachycardia with history of thoracic dissection, healed in 2014.  She does also have some mild hypertension. To add very low dose Toprol XL 12.5mg once daily. To monitor BP and HR, and FU with me in 6-8 weeks.    2. Hyperlipidemia, not currently on any therapy.    3. Hip pain, mostly right hip, treated with injections.    Plan as " above. FU with me in 6-8 weeks. Will repeat labs then. FU sooner if clinical condition changes.    Collaborating MD: Jose

## 2018-06-07 ENCOUNTER — OFFICE VISIT (OUTPATIENT)
Dept: CARDIOLOGY | Facility: PHYSICIAN GROUP | Age: 83
End: 2018-06-07
Payer: MEDICARE

## 2018-06-07 VITALS
HEIGHT: 68 IN | DIASTOLIC BLOOD PRESSURE: 69 MMHG | WEIGHT: 108 LBS | HEART RATE: 68 BPM | OXYGEN SATURATION: 95 % | SYSTOLIC BLOOD PRESSURE: 137 MMHG | BODY MASS INDEX: 16.37 KG/M2

## 2018-06-07 DIAGNOSIS — I07.1 TRICUSPID VALVE INSUFFICIENCY, UNSPECIFIED ETIOLOGY: ICD-10-CM

## 2018-06-07 DIAGNOSIS — R00.0 SINUS TACHYCARDIA: ICD-10-CM

## 2018-06-07 DIAGNOSIS — I71.40 ABDOMINAL AORTIC ANEURYSM (AAA) WITHOUT RUPTURE (HCC): ICD-10-CM

## 2018-06-07 DIAGNOSIS — I11.9 BENIGN HYPERTENSIVE HEART DISEASE WITHOUT HEART FAILURE: ICD-10-CM

## 2018-06-07 DIAGNOSIS — I51.7 LVH (LEFT VENTRICULAR HYPERTROPHY): ICD-10-CM

## 2018-06-07 DIAGNOSIS — I71.019 DISSECTION OF THORACIC AORTA (HCC): ICD-10-CM

## 2018-06-07 DIAGNOSIS — I10 ESSENTIAL HYPERTENSION, BENIGN: ICD-10-CM

## 2018-06-07 PROCEDURE — 99214 OFFICE O/P EST MOD 30 MIN: CPT | Performed by: NURSE PRACTITIONER

## 2018-06-07 ASSESSMENT — ENCOUNTER SYMPTOMS
CHILLS: 0
LOSS OF CONSCIOUSNESS: 0
FEVER: 0
ORTHOPNEA: 0
COUGH: 0
DIZZINESS: 0
PALPITATIONS: 0
SHORTNESS OF BREATH: 0
PND: 0
BRUISES/BLEEDS EASILY: 0
MYALGIAS: 0
ABDOMINAL PAIN: 0

## 2018-06-07 NOTE — PROGRESS NOTES
Chief Complaint   Patient presents with   • Follow-Up   • Aortic Dissection   • HTN (Controlled)       Subjective:   Janet Bartlett is a 89 y.o. female who presents today for two month follow-up of sinus tachycardia and medication addition evaluation.    Janet is a 89 year female with history of aortic dissection (managed medically and healed on CT angiogram in 2014), hypertension, and hyperlipidemia, previously followed by Dr. Arguelles. In summer 2017,  she had a left hip replacement; she was told, unless it was absolutely necessary, she should not have the right one done, due to age and risk.  She continues to get injections in her right hip.    At last follow-up, she was having some mild sinus tachycardia, and low dose Toprol was added.    She is here today for follow-up. She is tolerating the Toprol without any problems. BP and HR seem to be better. No chest pain, pressure or discomfort; no palpitations; breathing is stable, and she uses oxygen at 0.5L.minute. No orthopnea or PND. No dizziness or syncope; mild LE edema to the ankles.       Past Medical History:   Diagnosis Date   • Abdominal aortic aneurysm (AAA) without rupture ([facility])     Small saccular aneurysm distal abdominal aorta    • Aortic dissection (HCC)     Pool type B, healed as of last computed tomography scan in 2014.   • Aspiration pneumonia ([facility]) 5/2016   • Bronchiectasis ([facility])    • Empyema ([facility]) 5/2016    Left hemithorax, successfully drained and treated   • Hypercholesteremia    • Hypertension    • LVH (left ventricular hypertrophy) 2012    Anterior Q waves, probably due to LVH, normal wall motion and LVH confirmed by echo.    • Mitral regurgitation    • Nonspecific abnormal electrocardiogram (ECG) (EKG)     See above, pattern stable.   • Pulmonary hypertension ([facility])    • Respiratory failure ([facility]) 5/2016   • Sepsis ([facility]) 5/2016   • Tricuspid regurgitation 05/2016    Echocardiogram with normal LV size, LVEF 75%. Severely dilated RV. Trace  AI. Severe TR, RVSP 80mmHg.     Past Surgical History:   Procedure Laterality Date   • THORACOTOMY Left 5/27/2016    Procedure: THORACOTOMY Mini with;  Surgeon: Jose Lafleur M.D.;  Location: SURGERY [facility];  Service:    • THORACOSCOPY Left 5/27/2016    Procedure: THORACOSCOPY;  Surgeon: Jose Lafleur M.D.;  Location: SURGERY [facility];  Service:    • HIP ARTHROPLASTY TOTAL Left     Prior to the above.   • OTHER ORTHOPEDIC SURGERY  open fracture reduction    Left arm     Family History   Problem Relation Age of Onset   • Heart Attack Mother    • Heart Attack Father    • Other Daughter      Multiple Sclerosis     Social History     Social History   • Marital status:      Spouse name: N/A   • Number of children: N/A   • Years of education: N/A     Occupational History   • Not on file.     Social History Main Topics   • Smoking status: Never Smoker   • Smokeless tobacco: Never Used   • Alcohol use No   • Drug use: No   • Sexual activity: Not on file     Other Topics Concern   • Not on file     Social History Narrative   • No narrative on file     Allergies   Allergen Reactions   • Penicillins Hives     Outpatient Encounter Prescriptions as of 6/7/2018   Medication Sig Dispense Refill   • metoprolol SR (TOPROL XL) 25 MG TABLET SR 24 HR Take 0.5 Tabs by mouth every day. 15 Tab 6   • budesonide-formoterol (SYMBICORT) 80-4.5 MCG/ACT Aerosol Inhale 2 Puffs by mouth 2 Times a Day. Use spacer. Rinse mouth after each use. 1 Inhaler 11   • aspirin (ASA) 81 MG Chew Tab chewable tablet 1 Tab by Per NG Tube route every day. 100 Tab 11   • Ibuprofen (ADVIL) 200 MG Cap Take  by mouth.     • acetaminophen 650 MG Tab 650 mg by Per NG Tube route every 6 hours as needed (Mild Pain; (Pain scale 1-3); Temp greater than 100.5 F). 30 Tab 0     No facility-administered encounter medications on file as of 6/7/2018.      Review of Systems   Constitutional: Negative for chills, fever and malaise/fatigue.   HENT:  "Negative for congestion.    Respiratory: Negative for cough and shortness of breath.    Cardiovascular: Negative for chest pain, palpitations, orthopnea, leg swelling and PND.   Gastrointestinal: Negative for abdominal pain.   Musculoskeletal: Negative for myalgias.   Neurological: Negative for dizziness and loss of consciousness.   Endo/Heme/Allergies: Does not bruise/bleed easily.        Objective:   /69   Pulse 68   Ht 1.727 m (5' 8\")   Wt 49 kg (108 lb)   SpO2 95%   BMI 16.42 kg/m²     Physical Exam   Constitutional: She is oriented to person, place, and time. She appears well-developed and well-nourished.   Petite, thin.  Using oxygen via nasal canula at 0.5L/minute.   HENT:   Head: Normocephalic.   Eyes: EOM are normal.   Neck: Normal range of motion. Neck supple. No JVD present.   Cardiovascular: Normal rate and regular rhythm.    Murmur heard.   Systolic murmur is present with a grade of 2/6   HR 95-100bpm   Pulmonary/Chest: Effort normal and breath sounds normal. No respiratory distress. She has no wheezes. She has no rales.   Abdominal: Soft. Bowel sounds are normal. She exhibits no distension. There is no tenderness.   Musculoskeletal: Normal range of motion. She exhibits edema.   Trace-1+ edema to the ankles bilaterally.   Neurological: She is alert and oriented to person, place, and time.   Skin: Skin is warm and dry. No rash noted.   Psychiatric: She has a normal mood and affect.       Assessment:     1. Dissection of thoracic aorta (HCC)     2. Abdominal aortic aneurysm (AAA) without rupture (HCC)     3. Benign hypertensive heart disease without heart failure     4. Essential hypertension, benign     5. LVH (left ventricular hypertrophy)     6. Tricuspid valve insufficiency, unspecified etiology     7. Sinus tachycardia         Medical Decision Making:  Today's Assessment / Status / Plan:     1. Sinus tachycardia, improved on low dose Toprol.    2. Edema in ankles, to check " echocardiogram.    3. Aortic dissection and AAA, stable.    4. Hypertension, treated and stable. BP is good today.    5. LVH, to repeat echocardiogram.    6. Severe TR on echo in 2016, to repeat echo today.    Same medications for now. FU in 6 months with me or Dr. Hays.    Collaborating MD: Star

## 2018-06-07 NOTE — LETTER
[facility] [address]  Phone: [phone]  Fax: [phone]              Janet Bartlett  11/24/1927    Encounter Date: 6/7/2018    AURELIA Adler.          PROGRESS NOTE:  No notes on file      No Recipients

## 2018-06-14 ENCOUNTER — TELEPHONE (OUTPATIENT)
Dept: CARDIOLOGY | Facility: MEDICAL CENTER | Age: 83
End: 2018-06-14

## 2018-06-14 ENCOUNTER — OFFICE VISIT (OUTPATIENT)
Dept: PULMONOLOGY | Facility: HOSPICE | Age: 83
End: 2018-06-14
Payer: MEDICARE

## 2018-06-14 ENCOUNTER — APPOINTMENT (OUTPATIENT)
Dept: RADIOLOGY | Facility: IMAGING CENTER | Age: 83
End: 2018-06-14
Attending: NURSE PRACTITIONER
Payer: MEDICARE

## 2018-06-14 VITALS
SYSTOLIC BLOOD PRESSURE: 118 MMHG | BODY MASS INDEX: 15.91 KG/M2 | HEIGHT: 68 IN | RESPIRATION RATE: 16 BRPM | WEIGHT: 105 LBS | HEART RATE: 83 BPM | TEMPERATURE: 98.8 F | OXYGEN SATURATION: 93 % | DIASTOLIC BLOOD PRESSURE: 70 MMHG

## 2018-06-14 DIAGNOSIS — J44.9 CHRONIC OBSTRUCTIVE PULMONARY DISEASE, UNSPECIFIED COPD TYPE (HCC): ICD-10-CM

## 2018-06-14 DIAGNOSIS — R09.02 HYPOXEMIA: ICD-10-CM

## 2018-06-14 DIAGNOSIS — I27.20 PULMONARY HYPERTENSION (HCC): ICD-10-CM

## 2018-06-14 DIAGNOSIS — R13.10 DYSPHAGIA, UNSPECIFIED TYPE: ICD-10-CM

## 2018-06-14 DIAGNOSIS — Z87.09 H/O PLEURAL EFFUSION: ICD-10-CM

## 2018-06-14 DIAGNOSIS — Z87.01 H/O: PNEUMONIA: ICD-10-CM

## 2018-06-14 PROBLEM — I34.0 MITRAL REGURGITATION: Status: ACTIVE | Noted: 2018-06-14

## 2018-06-14 PROCEDURE — 71046 X-RAY EXAM CHEST 2 VIEWS: CPT | Mod: 26 | Performed by: NURSE PRACTITIONER

## 2018-06-14 PROCEDURE — 99214 OFFICE O/P EST MOD 30 MIN: CPT | Performed by: NURSE PRACTITIONER

## 2018-06-14 NOTE — PATIENT INSTRUCTIONS
1) Recent Echo ordered by Cardiology per pt for increased lower extremity edema indicates worsening mitral regurgitation and worsening Pulmonary hypertension. She is encouraged to contact her Cardiologist for further work up on this. She denies worsening dyspnea. However she has recently required 02 2 LPM 24/7. She had  was last seen in the office. Chest xray today in the office indicates; no active cardiopulmonary abnormalities identified.  2) Encouraged to restart Symbicort 80/4.5 mcg 2 puffs INH bid, rinse mouth after use. Cost is an issue. [place] assistance forms provided to pt.   3) Patient is up to date on her Prevnar 13, Pneumovax 23 and Influenza vaccinations.  4) Follow up in 3 months, sooner OV if needed. Discussed importance of prompt treatment of acute infectious symptoms.

## 2018-06-14 NOTE — PROGRESS NOTES
Chief Complaint   Patient presents with   • Follow-Up       HPI:  Janet Bartlett is a 89 y.o. year old female here today for follow-up on COPD and hypoxemia. She was previously seen in our office with Dr. Ko. She has a history of an aspiration pneumonia and empyema that required decortication in May 2016. Pulmonary function testing in October 2016 demonstrated an FEV1 of 1.51 L which is 72% of predicted. Her lung volumes demonstrated hyperinflation. DLCO was not done. PFT's were updated 2/7/2018 and indicated an FEV1 of 1.06 L, 60% predicted with an FEV1/FVC ratio of 54, TLC of 99% with a DLCO of 69% predicted. She was placed on a trial of Anoro. Her insurance would not cover and she was switched to Symbicort.     CT scan in July 2016 showed some residual pleural thickening. She also has a history of pulmonary hypertension on an echocardiogram when she was hospitalized. She had a 6 minute walk test October 2016 which showed no evidence of oxygen desaturation.   Repeat Echo 6/7/2018 indicates an EF of 65%. Her mitral regurgitation is worse and her RVSP has increased from 35-65 mmHg.      She states she was again admitted to the hospital with pneumonia in [place]. She was hospitalized 6/16-6/19/2017. She was treated in hospital with antibiotics. She was also discharged on antibiotics which she completed, but is unsure of what antibiotic. She also sent home on 02 at 2 LPM. She was feeling much better at her follow up visit and wanted to see if she could stop 02. Multi oximetry indicated a low 02 of 91% and her daytime 02 was discontinued. CNOX was performed on RA through her DME 7/18/2017 and indicates a mean 02 saturation of 92.4%. She had a couple episodes of desaturations spending 9 minutes with saturations less than 88%. However, this may be due to artifact.      She continued to c/o cough after drinking liquids. She was referred to Speech Pathology for further work up on this. She saw Speech Pathology in  [place] who did not feel she was aspirating. She states she was given exercises to do at home.      She does see Dr. Arguelles for Cardiology.      Repeat chest xray 8/11/2017 performed at [facility] indicates; pleural effusions have largely resolved. No acute infiltrate identified.      She states she was again hospitalized in [place] January 2018 for Influenza. She was treated with TamiFlu and antibiotics.      She states she was restarted on 02 at 2 LPM nocturnally. Since her last office visit she was restarted on daytime 02 as well. She is on 02 at 2 LPM today with a saturation of 93%.     She used the Symbicort for a few months. She was unsure of whether or not it helped and stopped it a couple weeks ago. She did not feel the Anoro worked either. She also states cost was an issues. She denies significant dyspnea. However her 02 requirements have increased the past couple months. She denies current cough or mucous. She denies wheezing.  She denies current fevers or chills. She has had increased lower extremity edema as well. She is not on a diuretic.         Past Medical History:   Diagnosis Date   • Abdominal aortic aneurysm (AAA) without rupture (HCC)     Small saccular aneurysm distal abdominal aorta    • Aortic dissection (HCC)     [place] type B, healed as of last computed tomography scan in 2014.   • Aspiration pneumonia ([facility]) 5/2016   • Bronchiectasis ([facility])    • Empyema ([facility]) 5/2016    Left hemithorax, successfully drained and treated   • Hypercholesteremia    • Hypertension    • LVH (left ventricular hypertrophy) 2012    Anterior Q waves, probably due to LVH, normal wall motion and LVH confirmed by echo.    • Mitral regurgitation 06/2018    Echocardiogram with normal LV size, LVEF 65%. Severely increased RA. Moderate-severe MR. Moderate TR.   • Nonspecific abnormal electrocardiogram (ECG) (EKG)     See above, pattern stable.   • Pulmonary hypertension (HCC)    • Respiratory failure  ([facility]) 5/2016   • Sepsis ([facility]) 5/2016   • Tricuspid regurgitation 06/2018    Echocardiogram with moderate TR.       Past Surgical History:   Procedure Laterality Date   • THORACOTOMY Left 5/27/2016    Procedure: THORACOTOMY Mini with;  Surgeon: Jose Lafleur M.D.;  Location: SURGERY [facility];  Service:    • THORACOSCOPY Left 5/27/2016    Procedure: THORACOSCOPY;  Surgeon: Jose Lafleur M.D.;  Location: SURGERY [facility];  Service:    • HIP ARTHROPLASTY TOTAL Left     Prior to the above.   • OTHER ORTHOPEDIC SURGERY  open fracture reduction    Left arm       Family History   Problem Relation Age of Onset   • Heart Attack Mother    • Heart Attack Father    • Other Daughter      Multiple Sclerosis       Social History     Social History   • Marital status:      Spouse name: N/A   • Number of children: N/A   • Years of education: N/A     Occupational History   • Not on file.     Social History Main Topics   • Smoking status: Never Smoker   • Smokeless tobacco: Never Used   • Alcohol use No   • Drug use: No   • Sexual activity: Not on file     Other Topics Concern   • Not on file     Social History Narrative   • No narrative on file         ROS:  Constitutional: Denies fevers, chills, sweats, fatigue, weight loss  Eyes: Denies vision loss, pain, drainage, double vision. Wears glasses   Ears/Nose/Mouth/Throat: Denies rhinitis, nasal congestion, ear ache, difficulty hearing, sore throat, persistent hoarseness, decayed teeth/toothache  Cardiovascular: Denies chest pain, tightness, palpitations, swelling in feet/legs, fainting, difficulty breathing when laying down  Respiratory: See HPI   GI: Denies heartburn, difficulty swallowing, nausea, vomiting, abdominal pain, diarrhea, constipation  : Denies frequent urination, painful urination  Integumentary: Denies rashes, lumps or color changes  MSK: Denies painful joints, sore muscles, and back pain.   Neurological: Denies frequent headaches, dizziness,  "weakness        Current Outpatient Prescriptions   Medication Sig Dispense Refill   • metoprolol SR (TOPROL XL) 25 MG TABLET SR 24 HR Take 0.5 Tabs by mouth every day. 15 Tab 6   • aspirin (ASA) 81 MG Chew Tab chewable tablet 1 Tab by Per NG Tube route every day. 100 Tab 11   • budesonide-formoterol (SYMBICORT) 80-4.5 MCG/ACT Aerosol Inhale 2 Puffs by mouth 2 Times a Day. Use spacer. Rinse mouth after each use. 1 Inhaler 11   • Ibuprofen (ADVIL) 200 MG Cap Take  by mouth.     • acetaminophen 650 MG Tab 650 mg by Per NG Tube route every 6 hours as needed (Mild Pain; (Pain scale 1-3); Temp greater than 100.5 F). 30 Tab 0     No current facility-administered medications for this visit.        Allergies   Allergen Reactions   • Penicillins Hives       Blood pressure 118/70, pulse 83, temperature 37.1 °C (98.8 °F), resp. rate 16, height 1.727 m (5' 8\"), weight 47.6 kg (105 lb), SpO2 93 %.    PE:   Appearance: Thin elderly female, no acute distress  Eyes: PERRL, EOM intact, sclera white, conjunctiva moist  Ears: no lesions or deformities  Hearing: grossly intact  Nose: no lesions or deformities  Oropharynx: tongue normal, posterior pharynx without erythema or exudate  Neck: supple, trachea midline, no masses   Respiratory effort: no intercostal retractions or use of accessory muscles  Lung auscultation: diminished throughour  Heart auscultation: no murmur rub or gallop  Extremities: no cyanosis. 1+ bilateral lower extremity edema, gregory hose stockings  Abdomen: soft ,non tender, no masses  Gait and Station: normal  Digits and nails: no clubbing, cyanosis, petechiae or nodes.  Cranial nerves: grossly intact  Skin: no rashes, lesions or ulcers noted  Orientation: Oriented to time, person and place  Mood and affect: mood and affect appropriate, normal interaction with examiner  Judgement: Intact          Assessment:    1. Chronic obstructive pulmonary disease, unspecified COPD type (HCC)     2. H/O: pneumonia      with empyema " secondary to aspiration resolved   3. Pulmonary hypertension (HCC)     4. H/O pleural effusion      Resolved   5. Hypoxemia  DX-CHEST-2 VIEWS   6. Dysphagia, unspecified type           Plan:    1) Recent Echo ordered by Cardiology per pt for increased lower extremity edema indicates worsening mitral regurgitation and worsening Pulmonary hypertension. She is encouraged to contact her Cardiologist for further work up on this. She denies worsening dyspnea. However she has recently required 02 2 LPM 24/7. She had  was last seen in the office. Chest xray today in the office indicates; no active cardiopulmonary abnormalities identified.  2) Encouraged to restart Symbicort 80/4.5 mcg 2 puffs INH bid, rinse mouth after use. Cost is an issue. [place] assistance forms provided to pt.   3) Patient is up to date on her Prevnar 13, Pneumovax 23 and Influenza vaccinations.  4) Follow up in 3 months, sooner OV if needed. Discussed importance of prompt treatment of acute infectious symptoms.

## 2018-06-14 NOTE — TELEPHONE ENCOUNTER
I spoke to Janet and her grand-daughter.  She is going to read the packet I mailed her regarding the procedure and call back Marissa or I to let us know if she is interested.  If so, Lodya Grove can see if she would be a candidate/ Tanesha Irving.

## 2018-06-14 NOTE — LETTER
MARY Sanchez  [facility] Pulmonary Medicine   [address] [place] [address] [place] [address]  Phone: [phone] - Fax: [phone]           Encounter Date: 6/14/2018  Provider: MARY Sanchez  Location of Care: [facility] PULMONARY MEDICINE      Patient:   Janet Bartlett   MR Number: [id number]   YOB: 1927     PROGRESS NOTE:  Chief Complaint   Patient presents with   • Follow-Up       HPI:  Janet Bartlett is a 89 y.o. year old female here today for follow-up on COPD and hypoxemia. She was previously seen in our office with Dr. Ko. She has a history of an aspiration pneumonia and empyema that required decortication in May 2016. Pulmonary function testing in October 2016 demonstrated an FEV1 of 1.51 L which is 72% of predicted. Her lung volumes demonstrated hyperinflation. DLCO was not done. PFT's were updated 2/7/2018 and indicated an FEV1 of 1.06 L, 60% predicted with an FEV1/FVC ratio of 54, TLC of 99% with a DLCO of 69% predicted. She was placed on a trial of Anoro. Her insurance would not cover and she was switched to Symbicort.     CT scan in July 2016 showed some residual pleural thickening. She also has a history of pulmonary hypertension on an echocardiogram when she was hospitalized. She had a 6 minute walk test October 2016 which showed no evidence of oxygen desaturation.   Repeat Echo 6/7/2018 indicates an EF of 65%. Her mitral regurgitation is worse and her RVSP has increased from 35-65 mmHg.      She states she was again admitted to the hospital with pneumonia in [place]. She was hospitalized 6/16-6/19/2017. She was treated in hospital with antibiotics. She was also discharged on antibiotics which she completed, but is unsure of what antibiotic. She also sent home on 02 at 2 LPM. She was feeling much better at her follow up visit and wanted to see if she could stop 02. Multi oximetry indicated a low 02 of 91% and her daytime 02  was discontinued. CNOX was performed on RA through her DME 7/18/2017 and indicates a mean 02 saturation of 92.4%. She had a couple episodes of desaturations spending 9 minutes with saturations less than 88%. However, this may be due to artifact.      She continued to c/o cough after drinking liquids. She was referred to Speech Pathology for further work up on this. She saw Speech Pathology in [place] who did not feel she was aspirating. She states she was given exercises to do at home.      She does see Dr. Arguelles for Cardiology.      Repeat chest xray 8/11/2017 performed at [facility] indicates; pleural effusions have largely resolved. No acute infiltrate identified.      She states she was again hospitalized in [place] January 2018 for Influenza. She was treated with TamiFlu and antibiotics.      She states she was restarted on 02 at 2 LPM nocturnally. Since her last office visit she was restarted on daytime 02 as well. She is on 02 at 2 LPM today with a saturation of 93%.     She used the Symbicort for a few months. She was unsure of whether or not it helped and stopped it a couple weeks ago. She did not feel the Anoro worked either. She also states cost was an issues. She denies significant dyspnea. However her 02 requirements have increased the past couple months. She denies current cough or mucous. She denies wheezing.  She denies current fevers or chills. She has had increased lower extremity edema as well. She is not on a diuretic.         Past Medical History:   Diagnosis Date   • Abdominal aortic aneurysm (AAA) without rupture (HCC)     Small saccular aneurysm distal abdominal aorta    • Aortic dissection (HCC)     [place] type B, healed as of last computed tomography scan in 2014.   • Aspiration pneumonia ([facility]) 5/2016   • Bronchiectasis ([facility])    • Empyema ([facility]) 5/2016    Left hemithorax, successfully drained and treated   • Hypercholesteremia    • Hypertension    • LVH (left  ventricular hypertrophy) 2012    Anterior Q waves, probably due to LVH, normal wall motion and LVH confirmed by echo.    • Mitral regurgitation 06/2018    Echocardiogram with normal LV size, LVEF 65%. Severely increased RA. Moderate-severe MR. Moderate TR.   • Nonspecific abnormal electrocardiogram (ECG) (EKG)     See above, pattern stable.   • Pulmonary hypertension (HCC)    • Respiratory failure (HCC) 5/2016   • Sepsis (HCC) 5/2016   • Tricuspid regurgitation 06/2018    Echocardiogram with moderate TR.       Past Surgical History:   Procedure Laterality Date   • THORACOTOMY Left 5/27/2016    Procedure: THORACOTOMY Mini with;  Surgeon: Jose Lafleur M.D.;  Location: SURGERY [facility];  Service:    • THORACOSCOPY Left 5/27/2016    Procedure: THORACOSCOPY;  Surgeon: Jose Lafleur M.D.;  Location: SURGERY [facility];  Service:    • HIP ARTHROPLASTY TOTAL Left     Prior to the above.   • OTHER ORTHOPEDIC SURGERY  open fracture reduction    Left arm       Family History   Problem Relation Age of Onset   • Heart Attack Mother    • Heart Attack Father    • Other Daughter      Multiple Sclerosis       Social History     Social History   • Marital status:      Spouse name: N/A   • Number of children: N/A   • Years of education: N/A     Occupational History   • Not on file.     Social History Main Topics   • Smoking status: Never Smoker   • Smokeless tobacco: Never Used   • Alcohol use No   • Drug use: No   • Sexual activity: Not on file     Other Topics Concern   • Not on file     Social History Narrative   • No narrative on file         ROS:  Constitutional: Denies fevers, chills, sweats, fatigue, weight loss  Eyes: Denies vision loss, pain, drainage, double vision. Wears glasses   Ears/Nose/Mouth/Throat: Denies rhinitis, nasal congestion, ear ache, difficulty hearing, sore throat, persistent hoarseness, decayed teeth/toothache  Cardiovascular: Denies chest pain, tightness, palpitations, swelling in  "feet/legs, fainting, difficulty breathing when laying down  Respiratory: See HPI   GI: Denies heartburn, difficulty swallowing, nausea, vomiting, abdominal pain, diarrhea, constipation  : Denies frequent urination, painful urination  Integumentary: Denies rashes, lumps or color changes  MSK: Denies painful joints, sore muscles, and back pain.   Neurological: Denies frequent headaches, dizziness, weakness        Current Outpatient Prescriptions   Medication Sig Dispense Refill   • metoprolol SR (TOPROL XL) 25 MG TABLET SR 24 HR Take 0.5 Tabs by mouth every day. 15 Tab 6   • aspirin (ASA) 81 MG Chew Tab chewable tablet 1 Tab by Per NG Tube route every day. 100 Tab 11   • budesonide-formoterol (SYMBICORT) 80-4.5 MCG/ACT Aerosol Inhale 2 Puffs by mouth 2 Times a Day. Use spacer. Rinse mouth after each use. 1 Inhaler 11   • Ibuprofen (ADVIL) 200 MG Cap Take  by mouth.     • acetaminophen 650 MG Tab 650 mg by Per NG Tube route every 6 hours as needed (Mild Pain; (Pain scale 1-3); Temp greater than 100.5 F). 30 Tab 0     No current facility-administered medications for this visit.        Allergies   Allergen Reactions   • Penicillins Hives       Blood pressure 118/70, pulse 83, temperature 37.1 °C (98.8 °F), resp. rate 16, height 1.727 m (5' 8\"), weight 47.6 kg (105 lb), SpO2 93 %.    PE:   Appearance: Thin elderly female, no acute distress  Eyes: PERRL, EOM intact, sclera white, conjunctiva moist  Ears: no lesions or deformities  Hearing: grossly intact  Nose: no lesions or deformities  Oropharynx: tongue normal, posterior pharynx without erythema or exudate  Neck: supple, trachea midline, no masses   Respiratory effort: no intercostal retractions or use of accessory muscles  Lung auscultation: diminished throughour  Heart auscultation: no murmur rub or gallop  Extremities: no cyanosis. 1+ bilateral lower extremity edema, gregory hose stockings  Abdomen: soft ,non tender, no masses  Gait and Station: normal  Digits and " nails: no clubbing, cyanosis, petechiae or nodes.  Cranial nerves: grossly intact  Skin: no rashes, lesions or ulcers noted  Orientation: Oriented to time, person and place  Mood and affect: mood and affect appropriate, normal interaction with examiner  Judgement: Intact          Assessment:    1. Chronic obstructive pulmonary disease, unspecified COPD type (HCC)     2. H/O: pneumonia      with empyema secondary to aspiration resolved   3. Pulmonary hypertension (HCC)     4. H/O pleural effusion      Resolved   5. Hypoxemia  DX-CHEST-2 VIEWS   6. Dysphagia, unspecified type           Plan:    1) Recent Echo ordered by Cardiology per pt for increased lower extremity edema indicates worsening mitral regurgitation and worsening Pulmonary hypertension. She is encouraged to contact her Cardiologist for further work up on this. She denies worsening dyspnea. However she has recently required 02 2 LPM 24/7. She had  was last seen in the office. Chest xray today in the office indicates; no active cardiopulmonary abnormalities identified.  2) Encouraged to restart Symbicort 80/4.5 mcg 2 puffs INH bid, rinse mouth after use. Cost is an issue. [place] assistance forms provided to pt.   3) Patient is up to date on her Prevnar 13, Pneumovax 23 and Influenza vaccinations.  4) Follow up in 3 months, sooner OV if needed. Discussed importance of prompt treatment of acute infectious symptoms.         Electronically signed by MARY Sanchez  on 06/14/18    MARY Adler  [address]  [address]  [place] [id number]  VIA In Basket

## 2018-06-14 NOTE — TELEPHONE ENCOUNTER
AURELIA Adler.  Christine Sher R.N.             Results show moderate-severe MR, moderate TR, and normal LVEF.   Given she is 89 years ago, is she interested in doing anything?   I could consult [place] and see if she might be a candidate for mitraclip?   Let me know.AB

## 2018-06-28 DIAGNOSIS — I27.20 PULMONARY HYPERTENSION (HCC): ICD-10-CM

## 2018-06-28 DIAGNOSIS — I34.0 MITRAL VALVE INSUFFICIENCY, UNSPECIFIED ETIOLOGY: ICD-10-CM

## 2018-06-28 DIAGNOSIS — I51.7 LVH (LEFT VENTRICULAR HYPERTROPHY): ICD-10-CM

## 2018-06-28 DIAGNOSIS — I07.1 TRICUSPID VALVE INSUFFICIENCY, UNSPECIFIED ETIOLOGY: ICD-10-CM

## 2018-06-28 NOTE — TELEPHONE ENCOUNTER
"Micki Nguyen ([facility]) calls back ([phone]).  They are interested in learning further about the Mitral Clip if she is a candidate.   Micki says the pulmonologist thinks \"her heart is affecting her breathing (and need for oxygen) more than her lungs\".     To [place] to advise further.   "

## 2018-07-05 DIAGNOSIS — Z01.810 PRE-OPERATIVE CARDIOVASCULAR EXAMINATION: ICD-10-CM

## 2018-07-05 DIAGNOSIS — I34.0 NON-RHEUMATIC MITRAL REGURGITATION: ICD-10-CM

## 2018-07-05 DIAGNOSIS — I05.9 MITRAL VALVE DISORDER: ICD-10-CM

## 2018-07-05 DIAGNOSIS — R06.02 SHORTNESS OF BREATH: ICD-10-CM

## 2018-07-06 ENCOUNTER — HOSPITAL ENCOUNTER (OUTPATIENT)
Facility: MEDICAL CENTER | Age: 83
End: 2018-07-06
Attending: INTERNAL MEDICINE | Admitting: INTERNAL MEDICINE
Payer: MEDICARE

## 2018-07-06 ENCOUNTER — TELEPHONE (OUTPATIENT)
Dept: CARDIOLOGY | Facility: MEDICAL CENTER | Age: 83
End: 2018-07-06

## 2018-07-06 NOTE — TELEPHONE ENCOUNTER
Spoke with pt's granddaughter Kezia, who has a couple questions regarding patients upcoming valve clinic consult. Explained that we have our patients scheduled for consult and testing consolidated to one day to prevent multiple trips back and forth to our campus to complete such. Kezia states that the patient lives alone in [place], across the street from her (Kezia's) parents. She states patient is very active for her age, however making multiple trips to [place] is difficult for her, so they do appreciate consolidating tests/consultations.     Explained that we do have appointment available for July 26. Kezia agrees this date will work well for her and the patient.     Further explained that after completion of testing/consultations, our heart team will present patient's case in valve conference to determine treatment options and confirm plan of care. After such conference I will update patient and family with heart team's recommendations and we may proceed with confirming treatment plan/date at that time. Kezia states understanding and that she is thankful that our team has a collaborative approach to treatment.     Kezia states no further questions at this time. She will be discussing appointments with patient and will return call with any further questions and/or concerns.

## 2018-07-06 NOTE — TELEPHONE ENCOUNTER
Message     The referral to Cardiothoracic Surgery will be sent to [facility]. The contact number is [phone].

## 2018-07-10 ENCOUNTER — TELEPHONE (OUTPATIENT)
Dept: CARDIOLOGY | Facility: MEDICAL CENTER | Age: 83
End: 2018-07-10

## 2018-07-10 NOTE — TELEPHONE ENCOUNTER
Spoke with pt and gave her detailed information about her appointments, which she has written down.  Pt thankful for the call.

## 2018-07-10 NOTE — TELEPHONE ENCOUNTER
----- Message from BERLIN Hussein sent at 2018  4:42 PM PDT -----  Regarding: VALVE NEW  VALVE NEW MITRAL    Name: Janet Bartlett  : [birth year] 89 y/o F  MR: [id number]  Referring MD: Tanesha SHEFFIELD  Inpatient/Outpatient Referral: outpatient  Referral to Valve Program done: yes  Referral to [facility] done: done  Echo Done: yes  Echo Reading: EF 65%, MD reading of regurgitation: mod-severe MR  Symptoms of MR: yes, RODRIGUES/fatigue  Cardiology Consult: yes  Angiogram: no, need to schedule if candidate  Need to schedule angiogram/STEPHANIE: need to schedule if candidate  Pertinent Hx: COPD, HTN, AAA, LVH, TR, PAH, HLD  Allergy to metal or contrast: no  Cr level (within 30 days): 0.36  Oral/IV hydration needed prior to CTA/cath: no  Social Concerns: unknown at this time, patient does live in [place]  Tentative Plan: Chioma Clip if meets criteria  STS: 5.1%/ CCI of 6 points and 2% 10 year mortality    PATIENT CLASS: Chioma Clip possible will most likely need STEPHANIE with angiogram prior, we will schedule soon if patient agreeable to do prior to consult  [facility] CONSULT WARRANTED AT THIS TIME: yes, please schedule patient as SLOT B on  for chioma clip

## 2018-07-13 ENCOUNTER — TELEPHONE (OUTPATIENT)
Dept: CARDIOLOGY | Facility: MEDICAL CENTER | Age: 83
End: 2018-07-13

## 2018-07-13 NOTE — TELEPHONE ENCOUNTER
Received a voice message from pt's granddaugther Kezia, stating that she had received a notification of an appointment with CTS on Wednesday 7/25. She states that it is difficult for patient to make the trip to [place] that frequently, as she is traveling in from [place].     Returned call, no answer. Left voice message explaining to please disregard notification of appointment 7/25. Explained that I was able to coordinate that appointment on 7/26 at 2:15pm, while the patient is in [place] for multiple other appointments. Encouraged return call if any further questions.

## 2018-07-26 ENCOUNTER — OFFICE VISIT (OUTPATIENT)
Dept: CARDIOLOGY | Facility: MEDICAL CENTER | Age: 83
End: 2018-07-26
Payer: MEDICARE

## 2018-07-26 ENCOUNTER — HOSPITAL ENCOUNTER (OUTPATIENT)
Dept: RADIOLOGY | Facility: MEDICAL CENTER | Age: 83
End: 2018-07-26
Attending: INTERNAL MEDICINE
Payer: MEDICARE

## 2018-07-26 ENCOUNTER — TELEPHONE (OUTPATIENT)
Dept: CARDIOLOGY | Facility: MEDICAL CENTER | Age: 83
End: 2018-07-26

## 2018-07-26 ENCOUNTER — HOSPITAL ENCOUNTER (OUTPATIENT)
Dept: CARDIOLOGY | Facility: MEDICAL CENTER | Age: 83
End: 2018-07-26
Attending: INTERNAL MEDICINE | Admitting: INTERNAL MEDICINE
Payer: MEDICARE

## 2018-07-26 VITALS
HEIGHT: 68 IN | SYSTOLIC BLOOD PRESSURE: 122 MMHG | DIASTOLIC BLOOD PRESSURE: 80 MMHG | WEIGHT: 98 LBS | BODY MASS INDEX: 14.85 KG/M2 | OXYGEN SATURATION: 92 % | HEART RATE: 82 BPM

## 2018-07-26 DIAGNOSIS — R06.02 SOB (SHORTNESS OF BREATH): ICD-10-CM

## 2018-07-26 DIAGNOSIS — I34.0 SEVERE MITRAL REGURGITATION: ICD-10-CM

## 2018-07-26 DIAGNOSIS — R06.02 SHORTNESS OF BREATH: ICD-10-CM

## 2018-07-26 DIAGNOSIS — Z01.810 PRE-OPERATIVE CARDIOVASCULAR EXAMINATION: ICD-10-CM

## 2018-07-26 DIAGNOSIS — I34.0 NON-RHEUMATIC MITRAL REGURGITATION: ICD-10-CM

## 2018-07-26 DIAGNOSIS — I71.019 DISSECTION OF THORACIC AORTA (HCC): ICD-10-CM

## 2018-07-26 DIAGNOSIS — J43.8 OTHER EMPHYSEMA (HCC): ICD-10-CM

## 2018-07-26 DIAGNOSIS — Z99.81 SUPPLEMENTAL OXYGEN DEPENDENT: ICD-10-CM

## 2018-07-26 PROBLEM — J44.9 COPD (CHRONIC OBSTRUCTIVE PULMONARY DISEASE) (HCC): Status: ACTIVE | Noted: 2018-07-26

## 2018-07-26 LAB
ABO GROUP BLD: NORMAL
ANION GAP SERPL CALC-SCNC: 9 MMOL/L (ref 0–11.9)
BLD GP AB SCN SERPL QL: NORMAL
BUN SERPL-MCNC: 18 MG/DL (ref 8–22)
CALCIUM SERPL-MCNC: 9.7 MG/DL (ref 8.5–10.5)
CHLORIDE SERPL-SCNC: 102 MMOL/L (ref 96–112)
CO2 SERPL-SCNC: 27 MMOL/L (ref 20–33)
CREAT SERPL-MCNC: 0.63 MG/DL (ref 0.5–1.4)
EKG IMPRESSION: NORMAL
ERYTHROCYTE [DISTWIDTH] IN BLOOD BY AUTOMATED COUNT: 46.3 FL (ref 35.9–50)
GLUCOSE SERPL-MCNC: 153 MG/DL (ref 65–99)
HCT VFR BLD AUTO: 44.2 % (ref 37–47)
HGB BLD-MCNC: 13.9 G/DL (ref 12–16)
INR PPP: 1.17 (ref 0.87–1.13)
MCH RBC QN AUTO: 28.1 PG (ref 27–33)
MCHC RBC AUTO-ENTMCNC: 31.4 G/DL (ref 33.6–35)
MCV RBC AUTO: 89.3 FL (ref 81.4–97.8)
PLATELET # BLD AUTO: 234 K/UL (ref 164–446)
PMV BLD AUTO: 10.2 FL (ref 9–12.9)
POTASSIUM SERPL-SCNC: 3.6 MMOL/L (ref 3.6–5.5)
PROTHROMBIN TIME: 14.6 SEC (ref 12–14.6)
RBC # BLD AUTO: 4.95 M/UL (ref 4.2–5.4)
RH BLD: NORMAL
SODIUM SERPL-SCNC: 138 MMOL/L (ref 135–145)
WBC # BLD AUTO: 7.3 K/UL (ref 4.8–10.8)

## 2018-07-26 PROCEDURE — 99205 OFFICE O/P NEW HI 60 MIN: CPT | Mod: 25 | Performed by: INTERNAL MEDICINE

## 2018-07-26 PROCEDURE — 86850 RBC ANTIBODY SCREEN: CPT

## 2018-07-26 PROCEDURE — 86900 BLOOD TYPING SEROLOGIC ABO: CPT

## 2018-07-26 PROCEDURE — 36415 COLL VENOUS BLD VENIPUNCTURE: CPT

## 2018-07-26 PROCEDURE — 93000 ELECTROCARDIOGRAM COMPLETE: CPT | Mod: 59 | Performed by: INTERNAL MEDICINE

## 2018-07-26 PROCEDURE — 94618 PULMONARY STRESS TESTING: CPT | Performed by: INTERNAL MEDICINE

## 2018-07-26 PROCEDURE — 85027 COMPLETE CBC AUTOMATED: CPT

## 2018-07-26 PROCEDURE — 93880 EXTRACRANIAL BILAT STUDY: CPT

## 2018-07-26 PROCEDURE — 94010 BREATHING CAPACITY TEST: CPT

## 2018-07-26 PROCEDURE — 93880 EXTRACRANIAL BILAT STUDY: CPT | Mod: 26 | Performed by: INTERNAL MEDICINE

## 2018-07-26 PROCEDURE — 86901 BLOOD TYPING SEROLOGIC RH(D): CPT

## 2018-07-26 PROCEDURE — 85610 PROTHROMBIN TIME: CPT

## 2018-07-26 PROCEDURE — 80048 BASIC METABOLIC PNL TOTAL CA: CPT

## 2018-07-26 ASSESSMENT — ENCOUNTER SYMPTOMS
CLAUDICATION: 0
WEIGHT LOSS: 0
BRUISES/BLEEDS EASILY: 1
WEAKNESS: 1
EYES NEGATIVE: 1
DIAPHORESIS: 1
DOUBLE VISION: 0
PSYCHIATRIC NEGATIVE: 1
HEADACHES: 0
DEPRESSION: 0
PALPITATIONS: 1
DIZZINESS: 1
BLURRED VISION: 0
SHORTNESS OF BREATH: 1
NAUSEA: 1
MYALGIAS: 0
ABDOMINAL PAIN: 0
NERVOUS/ANXIOUS: 0
VOMITING: 1
FEVER: 0
CHILLS: 0
COUGH: 1
FOCAL WEAKNESS: 0

## 2018-07-26 NOTE — RESPIRATORY CARE
"Pulmonary Function Test Results (PFT)    Spirometry Actual Predicted   FVC (L) PEF: 2.77L/s (07/26/18 1251) Predicted PEF: 5.43 l/s (07/26/18 1251)   FEV1 ((L) FEV1: 0.99 l (07/26/18 1251) Predicted FEV1: 2.33L (07/26/18 1251)   FEV1/FVC (%) VC: 1.78L (07/26/18 1251) Predicted VC: 3.11L (07/26/18 1251)   FEF 25-75% (L/sec)         Test performed in accordance with ATS/ERS standards. Predictive values from NHANES III and graded \"A\". Patient performed test with good effort and at least 3 reproducible tests. Test printed and copy left in chart.    "

## 2018-07-26 NOTE — TELEPHONE ENCOUNTER
TMVR functional assessment    Mental Status Assessment:  Appearance: normal  Behavior: normal  Mood/Affect: normal  Thought process/content: normal  Cognition: normal  Functional ability: normal, use of cane  Dental Concerns: full upper dentures, natural teeth on bottom, no jaw or tooth pains currently, brushes teeth and sees dentist regularly  Dental Clearance warranted: no  Further mental assessment needed: n/a     Post-op Plan of Care:  Support systems: granddarakesh Mast  Patient understands discharge plan: yes  DME: Cane, O2 continuous 2LNC   Home health warranted prior to TAVR: no  Social concerns for discharge: no  PCP alerted of social concerns: n/a  POLST: completed today, scanned into chart  Advance directive: completed in [place]. Will bring copy for medical records.  Flu/PNA vaccines completed: completed     Goal is to be able to continue to water her horses each morning.      Concerns prior to TAVR: NONE at this time but does need to complete angiogram and STEPHANIE. Will schedule if deemed candidate by [facility].       Met with patient and patient's granddaughter Kezia, at TMVR consult with Dr. Rosario to preform procedure education. Provided education regarding advanced directives and the importance of having such on file to advocate patient's medical treatment preferences and/or dedicated medical  in fact.Patient has completed AD in [place] and will bring copy for her medical records prior to TMVR. Reviewed TMVR patient guidelines packet as well as reviewed education/ instructions for pre-procedural preparation. Patient is TENTATIVELY scheduled for TMVR August 6, 2018 at this time. Provided printed pre-procedure instructions prior to TMVR specifically: no medications, no eating or drinking including mediations after midnight prior to TMVR, no medications AM of TMVR, bring current list of medications to check in day and patient is to check in at 0730 [facility] Surgery Check-in. Patient and Kezia state  understanding and have no further questions at this time. Patient was provided with my contact information and encouraged call with any questions or concerns. Walked patient  to check out to complete this encounter.

## 2018-07-26 NOTE — PROGRESS NOTES
Chief Complaint   Patient presents with   • Aortic Stenosis     Valve New patient       Subjective:   Janet Bartlett is a 89 y.o. female who presents today as a consult from Tanesha Kelly for severe mitral regurgitation.    Thank you for allowing me to evaluate Mrs. Bartlett, who as you know is a 89 year old female with cardiovascular history including severe mitral regurgitation, thoracic aortic dissection and Chronic obstructive pulmonary disease on chronic oxygen therapy. She was well until 2 months ago when she began to experience mild fatigue, mild shortness of breath, dyspnea on exertion and dizziness. She denies chest pain or syncope.    Past Medical History:   Diagnosis Date   • Abdominal aortic aneurysm (AAA) without rupture ([facility])     Small saccular aneurysm distal abdominal aorta    • Aortic dissection (HCC)     Benjamin type B, healed as of last computed tomography scan in 2014.   • Aspiration pneumonia ([facility]) 5/2016   • Bronchiectasis ([facility])    • Empyema ([facility]) 5/2016    Left hemithorax, successfully drained and treated   • Hypercholesteremia    • Hypertension    • LVH (left ventricular hypertrophy) 2012    Anterior Q waves, probably due to LVH, normal wall motion and LVH confirmed by echo.    • Mitral regurgitation 06/2018    Echocardiogram with normal LV size, LVEF 65%. Severely increased RA. Moderate-severe MR. Moderate TR.   • Nonspecific abnormal electrocardiogram (ECG) (EKG)     See above, pattern stable.   • Pulmonary hypertension ([facility])    • Respiratory failure ([facility]) 5/2016   • Sepsis ([facility]) 5/2016   • Tricuspid regurgitation 06/2018    Echocardiogram with moderate TR.   • Valvular heart disease      Past Surgical History:   Procedure Laterality Date   • THORACOTOMY Left 5/27/2016    Procedure: THORACOTOMY Mini with;  Surgeon: Jose Lafleur M.D.;  Location: SURGERY [facility];  Service:    • THORACOSCOPY Left 5/27/2016    Procedure: THORACOSCOPY;  Surgeon: Jose Lafleur M.D.;  Location: SURGERY  [facility];  Service:    • HIP ARTHROPLASTY TOTAL Left     Prior to the above.   • OTHER ORTHOPEDIC SURGERY  open fracture reduction    Left arm     Family History   Problem Relation Age of Onset   • Heart Attack Mother    • Heart Attack Father    • Other Daughter         Multiple Sclerosis     Social History     Social History   • Marital status:      Spouse name: N/A   • Number of children: N/A   • Years of education: N/A     Occupational History   • Not on file.     Social History Main Topics   • Smoking status: Never Smoker   • Smokeless tobacco: Never Used   • Alcohol use No   • Drug use: No   • Sexual activity: Not on file     Other Topics Concern   • Not on file     Social History Narrative   • No narrative on file     Allergies   Allergen Reactions   • Penicillins Hives     Medications reviewed.    Outpatient Encounter Prescriptions as of 7/26/2018   Medication Sig Dispense Refill   • metoprolol SR (TOPROL XL) 25 MG TABLET SR 24 HR Take 0.5 Tabs by mouth every day. 15 Tab 6   • Ibuprofen (ADVIL) 200 MG Cap Take  by mouth.     • acetaminophen 650 MG Tab 650 mg by Per NG Tube route every 6 hours as needed (Mild Pain; (Pain scale 1-3); Temp greater than 100.5 F). 30 Tab 0   • aspirin (ASA) 81 MG Chew Tab chewable tablet 1 Tab by Per NG Tube route every day. 100 Tab 11   • budesonide-formoterol (SYMBICORT) 80-4.5 MCG/ACT Aerosol Inhale 2 Puffs by mouth 2 Times a Day. Use spacer. Rinse mouth after each use. (Patient not taking: Reported on 7/26/2018) 1 Inhaler 11     No facility-administered encounter medications on file as of 7/26/2018.      Review of Systems   Constitutional: Positive for diaphoresis and malaise/fatigue. Negative for chills, fever and weight loss.   HENT: Negative.  Negative for hearing loss.    Eyes: Negative.  Negative for blurred vision and double vision.   Respiratory: Positive for cough and shortness of breath.    Cardiovascular: Positive for palpitations and leg swelling.  "Negative for chest pain and claudication.   Gastrointestinal: Positive for nausea and vomiting. Negative for abdominal pain.   Genitourinary: Negative.  Negative for dysuria and urgency.   Musculoskeletal: Positive for joint pain. Negative for myalgias.   Skin: Positive for itching. Negative for rash.   Neurological: Positive for dizziness and weakness. Negative for focal weakness and headaches.   Endo/Heme/Allergies: Bruises/bleeds easily.   Psychiatric/Behavioral: Negative.  Negative for depression. The patient is not nervous/anxious.         Objective:   /80   Pulse 82   Ht 1.727 m (5' 8\")   Wt 44.5 kg (98 lb)   SpO2 92%   BMI 14.90 kg/m²     Physical Exam   Constitutional: She is oriented to person, place, and time. She appears cachectic.   Using Oxygen.   HENT:   Head: Normocephalic and atraumatic.   Eyes: Pupils are equal, round, and reactive to light. Conjunctivae are normal.   Neck: Normal range of motion. Neck supple.   Cardiovascular: Normal rate and regular rhythm.    Murmur heard.  Pulmonary/Chest: Effort normal and breath sounds normal.   Abdominal: Soft. Bowel sounds are normal.   Musculoskeletal: Normal range of motion.   Neurological: She is alert and oriented to person, place, and time.   Skin: Skin is warm and dry.   Psychiatric: She has a normal mood and affect.     CARDIAC STUDIES/PROCEDURES:    CTA OF CHEST (04/24/14)  CT scan demonstrates a dissection, which begins distal to the subclavian artery and   continues to the level the thoracoabdominal junction.  It appears as if the   dissection is closing with luminal hematoma proximally but still has a patent   dissection tract in the distal thoracic aorta.  The maximal size of the   thoracic aorta is 2.9 cm and the arch 3.2 cm.    ECHOCARDIOGRAM CONCLUSIONS at [facility] (06/07/18)  Echocardiogram showing normal left ventricular systolic function, ejection fraction of 65%, severe mitral regurgitation and " estimated right ventricular systolic pressure of 65 mmHg.  (study result reviewed)    EKG was ordered for severe mitral regurgitation, performed on (07/26/18) and reviewed: EKG shows sinus rhythm with anterior Q waves.    Laboratory results of (06/06/16) were reviewed. Bun of 15 mg/dl, creatinine levels of 0.36 mg/dl noted.    Assessment:     1. Severe mitral regurgitation     2. Dissection of thoracic aorta (HCC)     3. Other emphysema (HCC)     4. Supplemental oxygen dependent       Medical Decision Making:  Today's Assessment / Status / Plan:     1. Severe mitral regurgitation: Severe mitral regurgitation was noted on his echocardiogram and she is symptomatic. We will perform an transesophageal echocardiogram and cardiac catheterization and refer to Woody Jarvis for possible MitraClip.  2. Thoracic aortic dissection (Pool type B): Medically managed by Dr. Tan  3. Chronic obstructive pulmonary disease on chronic oxygen therapy (2 L/min for half year): Managed by Dr. Ko.    The risks, benefits, and alternatives to coronary angiography with IV sedation were discussed in great detail. Specific risks mentioned include bleeding, infection, kidney damage, allergic reaction, cardiac perforation with possible tamponade requiring jacque-cardiocentesis or possible open heart surgery. In addition, we discussed that 10% of patients will experience small to moderate bruising at the side of the arterial puncture. Lastly the risks of heart attack, stroke, and death were discussed; the risks of major complications such as heart attack or stroke caused by the angiogram is less than 1%; the risk of death is approximately 1 in 1000. The patient verbalized understanding of these potential complications and wishes to proceed with this procedure.    The risks, benefits, and alternatives to MitraClip, general anesthesia and transesophageal echocardiogram were discussed in great detail. Specific risks mentioned include  bleeding, infection, kidney damage, allergic reaction, cardiac perforation with possible tamponade requiring jacque-cardiocentesis or possible open heart surgery if the patient is a candidate. Lastly the risks of heart attack, stroke, and death were discussed; the risks of major complications including  all cause mortality of 2.2%, stroke 0.9%, major bleeding complication is 7.4, pericardial tamponade 1.9%, clip specific complications (embolization 0%, partial clip detachment was 1.9%). The patient verbalized understanding of these potential complications and wishes to proceed with this procedure. (TRAMI registry 2015).  The procedure will be performed completely in collaboration with cardiac surgery.    We will follow up in two months.    Thank you for this consult.    CC Linda Early

## 2018-07-26 NOTE — LETTER
[facility]   [address]  [place] [address]  Phone: [phone]  Fax: [phone]              Janet Bartlett  11/24/1927    Encounter Date: 7/26/2018    Leonides Rosario M.D.          PROGRESS NOTE:  Chief Complaint   Patient presents with   • Aortic Stenosis     Valve New patient       Subjective:   Janet Bartlett is a 89 y.o. female who presents today as a consult from Tanesha Kelly for severe mitral regurgitation.    Thank you for allowing me to evaluate Mrs. Bartlett, who as you know is a 89 year old female with cardiovascular history including severe mitral regurgitation, thoracic aortic dissection and Chronic obstructive pulmonary disease on chronic oxygen therapy. She was well until 2 months ago when she began to experience mild fatigue, mild shortness of breath, dyspnea on exertion and dizziness. She denies chest pain or syncope.    Past Medical History:   Diagnosis Date   • Abdominal aortic aneurysm (AAA) without rupture ([facility])     Small saccular aneurysm distal abdominal aorta    • Aortic dissection (HCC)     Pool type B, healed as of last computed tomography scan in 2014.   • Aspiration pneumonia ([facility]) 5/2016   • Bronchiectasis ([facility])    • Empyema ([facility]) 5/2016    Left hemithorax, successfully drained and treated   • Hypercholesteremia    • Hypertension    • LVH (left ventricular hypertrophy) 2012    Anterior Q waves, probably due to LVH, normal wall motion and LVH confirmed by echo.    • Mitral regurgitation 06/2018    Echocardiogram with normal LV size, LVEF 65%. Severely increased RA. Moderate-severe MR. Moderate TR.   • Nonspecific abnormal electrocardiogram (ECG) (EKG)     See above, pattern stable.   • Pulmonary hypertension ([facility])    • Respiratory failure ([facility]) 5/2016   • Sepsis ([facility]) 5/2016   • Tricuspid regurgitation 06/2018    Echocardiogram with moderate TR.   • Valvular heart disease      Past Surgical History:   Procedure Laterality  Date   • THORACOTOMY Left 5/27/2016    Procedure: THORACOTOMY Mini with;  Surgeon: Jose Lafleur M.D.;  Location: SURGERY [facility];  Service:    • THORACOSCOPY Left 5/27/2016    Procedure: THORACOSCOPY;  Surgeon: Jose Lafleur M.D.;  Location: SURGERY [facility];  Service:    • HIP ARTHROPLASTY TOTAL Left     Prior to the above.   • OTHER ORTHOPEDIC SURGERY  open fracture reduction    Left arm     Family History   Problem Relation Age of Onset   • Heart Attack Mother    • Heart Attack Father    • Other Daughter         Multiple Sclerosis     Social History     Social History   • Marital status:      Spouse name: N/A   • Number of children: N/A   • Years of education: N/A     Occupational History   • Not on file.     Social History Main Topics   • Smoking status: Never Smoker   • Smokeless tobacco: Never Used   • Alcohol use No   • Drug use: No   • Sexual activity: Not on file     Other Topics Concern   • Not on file     Social History Narrative   • No narrative on file     Allergies   Allergen Reactions   • Penicillins Hives     Medications reviewed.    Outpatient Encounter Prescriptions as of 7/26/2018   Medication Sig Dispense Refill   • metoprolol SR (TOPROL XL) 25 MG TABLET SR 24 HR Take 0.5 Tabs by mouth every day. 15 Tab 6   • Ibuprofen (ADVIL) 200 MG Cap Take  by mouth.     • acetaminophen 650 MG Tab 650 mg by Per NG Tube route every 6 hours as needed (Mild Pain; (Pain scale 1-3); Temp greater than 100.5 F). 30 Tab 0   • aspirin (ASA) 81 MG Chew Tab chewable tablet 1 Tab by Per NG Tube route every day. 100 Tab 11   • budesonide-formoterol (SYMBICORT) 80-4.5 MCG/ACT Aerosol Inhale 2 Puffs by mouth 2 Times a Day. Use spacer. Rinse mouth after each use. (Patient not taking: Reported on 7/26/2018) 1 Inhaler 11     No facility-administered encounter medications on file as of 7/26/2018.      Review of Systems   Constitutional: Positive for diaphoresis and malaise/fatigue. Negative for chills,  "fever and weight loss.   HENT: Negative.  Negative for hearing loss.    Eyes: Negative.  Negative for blurred vision and double vision.   Respiratory: Positive for cough and shortness of breath.    Cardiovascular: Positive for palpitations and leg swelling. Negative for chest pain and claudication.   Gastrointestinal: Positive for nausea and vomiting. Negative for abdominal pain.   Genitourinary: Negative.  Negative for dysuria and urgency.   Musculoskeletal: Positive for joint pain. Negative for myalgias.   Skin: Positive for itching. Negative for rash.   Neurological: Positive for dizziness and weakness. Negative for focal weakness and headaches.   Endo/Heme/Allergies: Bruises/bleeds easily.   Psychiatric/Behavioral: Negative.  Negative for depression. The patient is not nervous/anxious.         Objective:   /80   Pulse 82   Ht 1.727 m (5' 8\")   Wt 44.5 kg (98 lb)   SpO2 92%   BMI 14.90 kg/m²      Physical Exam   Constitutional: She is oriented to person, place, and time. She appears cachectic.   Using Oxygen.   HENT:   Head: Normocephalic and atraumatic.   Eyes: Pupils are equal, round, and reactive to light. Conjunctivae are normal.   Neck: Normal range of motion. Neck supple.   Cardiovascular: Normal rate and regular rhythm.    Murmur heard.  Pulmonary/Chest: Effort normal and breath sounds normal.   Abdominal: Soft. Bowel sounds are normal.   Musculoskeletal: Normal range of motion.   Neurological: She is alert and oriented to person, place, and time.   Skin: Skin is warm and dry.   Psychiatric: She has a normal mood and affect.     CARDIAC STUDIES/PROCEDURES:    CTA OF CHEST (04/24/14)  CT scan demonstrates a dissection, which begins distal to the subclavian artery and   continues to the level the thoracoabdominal junction.  It appears as if the   dissection is closing with luminal hematoma proximally but still has a patent   dissection tract in the distal thoracic aorta.  The maximal size of the   "   thoracic aorta is 2.9 cm and the arch 3.2 cm.    ECHOCARDIOGRAM CONCLUSIONS at [facility] (06/07/18)  Echocardiogram showing normal left ventricular systolic function, ejection fraction of 65%, severe mitral regurgitation and estimated right ventricular systolic pressure of 65 mmHg.  (study result reviewed)    EKG was ordered for severe mitral regurgitation, performed on (07/26/18) and reviewed: EKG shows sinus rhythm with anterior Q waves.    Laboratory results of (06/06/16) were reviewed. Bun of 15 mg/dl, creatinine levels of 0.36 mg/dl noted.    Assessment:     1. Severe mitral regurgitation     2. Dissection of thoracic aorta (HCC)     3. Other emphysema (HCC)     4. Supplemental oxygen dependent       Medical Decision Making:  Today's Assessment / Status / Plan:     1. Severe mitral regurgitation: Severe mitral regurgitation was noted on his echocardiogram and she is symptomatic. We will perform an transesophageal echocardiogram and cardiac catheterization and refer to Woody Jarvis for possible MitraClip.  2. Thoracic aortic dissection (Pool type B): Medically managed by Dr. Tan  3. Chronic obstructive pulmonary disease on chronic oxygen therapy (2 L/min for half year): Managed by Dr. Ko.    The risks, benefits, and alternatives to coronary angiography with IV sedation were discussed in great detail. Specific risks mentioned include bleeding, infection, kidney damage, allergic reaction, cardiac perforation with possible tamponade requiring jacque-cardiocentesis or possible open heart surgery. In addition, we discussed that 10% of patients will experience small to moderate bruising at the side of the arterial puncture. Lastly the risks of heart attack, stroke, and death were discussed; the risks of major complications such as heart attack or stroke caused by the angiogram is less than 1%; the risk of death is approximately 1 in 1000. The patient verbalized understanding  of these potential complications and wishes to proceed with this procedure.    The risks, benefits, and alternatives to MitraClip, general anesthesia and transesophageal echocardiogram were discussed in great detail. Specific risks mentioned include bleeding, infection, kidney damage, allergic reaction, cardiac perforation with possible tamponade requiring jacque-cardiocentesis or possible open heart surgery if the patient is a candidate. Lastly the risks of heart attack, stroke, and death were discussed; the risks of major complications including  all cause mortality of 2.2%, stroke 0.9%, major bleeding complication is 7.4, pericardial tamponade 1.9%, clip specific complications (embolization 0%, partial clip detachment was 1.9%). The patient verbalized understanding of these potential complications and wishes to proceed with this procedure. (TRAMI registry 2015).  The procedure will be performed completely in collaboration with cardiac surgery.    We will follow up in two months.    Thank you for this consult.    CC Linda Early          No Recipients

## 2018-07-30 ENCOUNTER — TELEPHONE (OUTPATIENT)
Dept: CARDIOLOGY | Facility: MEDICAL CENTER | Age: 83
End: 2018-07-30

## 2018-07-30 NOTE — TELEPHONE ENCOUNTER
Spoke with patient explaining that Dr. Kapadia does recommend pt to proceed with STEPHANIE/ cardiac cath to determine candidacy for mitral clip. Pt states she will discuss this with her granddaughter Kezia and return call if/when she would like to schedule for STEPHANIE and cath. Pt states no further questions at this time. Provided my direct contact information and encouraged return call with any questions.

## 2018-07-31 ENCOUNTER — TELEPHONE (OUTPATIENT)
Dept: CARDIOLOGY | Facility: MEDICAL CENTER | Age: 83
End: 2018-07-31

## 2018-07-31 NOTE — TELEPHONE ENCOUNTER
Patient is scheduled on 8-2-18 for a STEPHANIE w/ Pre mitral clip R&L hrt cath w/poss with Dr. Garcia. No meds to stop. Patient was told to check in at 8:00 for a 10:00 procedure. Per Dr. ELLIOT black ok'd for STEPHANIE to be done with conscious sedation.

## 2018-08-02 ENCOUNTER — APPOINTMENT (OUTPATIENT)
Dept: RADIOLOGY | Facility: MEDICAL CENTER | Age: 83
End: 2018-08-02
Attending: INTERNAL MEDICINE
Payer: MEDICARE

## 2018-08-02 ENCOUNTER — HOSPITAL ENCOUNTER (OUTPATIENT)
Facility: MEDICAL CENTER | Age: 83
End: 2018-08-02
Attending: INTERNAL MEDICINE | Admitting: INTERNAL MEDICINE
Payer: MEDICARE

## 2018-08-02 VITALS
SYSTOLIC BLOOD PRESSURE: 136 MMHG | RESPIRATION RATE: 14 BRPM | BODY MASS INDEX: 14.9 KG/M2 | OXYGEN SATURATION: 95 % | HEART RATE: 66 BPM | DIASTOLIC BLOOD PRESSURE: 69 MMHG | TEMPERATURE: 98 F | WEIGHT: 98.33 LBS | HEIGHT: 68 IN

## 2018-08-02 LAB — APTT PPP: 29.4 SEC (ref 24.7–36)

## 2018-08-02 PROCEDURE — 304952 HCHG R 2 PADS

## 2018-08-02 PROCEDURE — 700111 HCHG RX REV CODE 636 W/ 250 OVERRIDE (IP)

## 2018-08-02 PROCEDURE — 85730 THROMBOPLASTIN TIME PARTIAL: CPT

## 2018-08-02 PROCEDURE — 71046 X-RAY EXAM CHEST 2 VIEWS: CPT

## 2018-08-02 PROCEDURE — 93460 R&L HRT ART/VENTRICLE ANGIO: CPT

## 2018-08-02 PROCEDURE — 360979 HCHG DIAGNOSTIC CATH

## 2018-08-02 PROCEDURE — C1769 GUIDE WIRE: HCPCS

## 2018-08-02 PROCEDURE — 160002 HCHG RECOVERY MINUTES (STAT)

## 2018-08-02 PROCEDURE — 700101 HCHG RX REV CODE 250

## 2018-08-02 PROCEDURE — 361014 HCHG 6FR ARROW SWAN/THERMO

## 2018-08-02 PROCEDURE — C1894 INTRO/SHEATH, NON-LASER: HCPCS

## 2018-08-02 PROCEDURE — 99153 MOD SED SAME PHYS/QHP EA: CPT

## 2018-08-02 PROCEDURE — 700117 HCHG RX CONTRAST REV CODE 255: Performed by: INTERNAL MEDICINE

## 2018-08-02 PROCEDURE — 93321 DOPPLER ECHO F-UP/LMTD STD: CPT

## 2018-08-02 PROCEDURE — 93312 ECHO TRANSESOPHAGEAL: CPT

## 2018-08-02 PROCEDURE — 93325 DOPPLER ECHO COLOR FLOW MAPG: CPT

## 2018-08-02 PROCEDURE — 99152 MOD SED SAME PHYS/QHP 5/>YRS: CPT

## 2018-08-02 PROCEDURE — 76937 US GUIDE VASCULAR ACCESS: CPT

## 2018-08-02 RX ORDER — VERAPAMIL HYDROCHLORIDE 2.5 MG/ML
INJECTION, SOLUTION INTRAVENOUS
Status: COMPLETED
Start: 2018-08-02 | End: 2018-08-02

## 2018-08-02 RX ORDER — MIDAZOLAM HYDROCHLORIDE 1 MG/ML
INJECTION INTRAMUSCULAR; INTRAVENOUS
Status: COMPLETED
Start: 2018-08-02 | End: 2018-08-02

## 2018-08-02 RX ORDER — ONDANSETRON 2 MG/ML
4 INJECTION INTRAMUSCULAR; INTRAVENOUS EVERY 4 HOURS PRN
Status: DISCONTINUED | OUTPATIENT
Start: 2018-08-02 | End: 2018-08-02 | Stop reason: HOSPADM

## 2018-08-02 RX ORDER — DIPHENHYDRAMINE HYDROCHLORIDE 50 MG/ML
25 INJECTION INTRAMUSCULAR; INTRAVENOUS EVERY 6 HOURS PRN
Status: DISCONTINUED | OUTPATIENT
Start: 2018-08-02 | End: 2018-08-02 | Stop reason: HOSPADM

## 2018-08-02 RX ORDER — ACETAMINOPHEN 325 MG/1
650 TABLET ORAL EVERY 6 HOURS PRN
Status: DISCONTINUED | OUTPATIENT
Start: 2018-08-02 | End: 2018-08-02 | Stop reason: HOSPADM

## 2018-08-02 RX ORDER — HEPARIN SODIUM,PORCINE 1000/ML
VIAL (ML) INJECTION
Status: COMPLETED
Start: 2018-08-02 | End: 2018-08-02

## 2018-08-02 RX ORDER — LIDOCAINE HYDROCHLORIDE 20 MG/ML
INJECTION, SOLUTION INFILTRATION; PERINEURAL
Status: COMPLETED
Start: 2018-08-02 | End: 2018-08-02

## 2018-08-02 RX ORDER — HALOPERIDOL 5 MG/ML
1 INJECTION INTRAMUSCULAR EVERY 6 HOURS PRN
Status: DISCONTINUED | OUTPATIENT
Start: 2018-08-02 | End: 2018-08-02 | Stop reason: HOSPADM

## 2018-08-02 RX ORDER — DEXAMETHASONE SODIUM PHOSPHATE 4 MG/ML
4 INJECTION, SOLUTION INTRA-ARTICULAR; INTRALESIONAL; INTRAMUSCULAR; INTRAVENOUS; SOFT TISSUE
Status: DISCONTINUED | OUTPATIENT
Start: 2018-08-02 | End: 2018-08-02 | Stop reason: HOSPADM

## 2018-08-02 RX ORDER — SCOLOPAMINE TRANSDERMAL SYSTEM 1 MG/1
1 PATCH, EXTENDED RELEASE TRANSDERMAL
Status: DISCONTINUED | OUTPATIENT
Start: 2018-08-02 | End: 2018-08-02 | Stop reason: HOSPADM

## 2018-08-02 RX ORDER — SODIUM CHLORIDE 9 MG/ML
INJECTION, SOLUTION INTRAVENOUS CONTINUOUS
Status: DISCONTINUED | OUTPATIENT
Start: 2018-08-02 | End: 2018-08-02 | Stop reason: HOSPADM

## 2018-08-02 RX ADMIN — SODIUM CHLORIDE: 9 INJECTION, SOLUTION INTRAVENOUS at 09:30

## 2018-08-02 RX ADMIN — MIDAZOLAM HYDROCHLORIDE 1.5 MG: 1 INJECTION, SOLUTION INTRAMUSCULAR; INTRAVENOUS at 11:59

## 2018-08-02 RX ADMIN — LIDOCAINE HYDROCHLORIDE: 20 INJECTION, SOLUTION INFILTRATION; PERINEURAL at 11:31

## 2018-08-02 RX ADMIN — IOHEXOL 60 ML: 350 INJECTION, SOLUTION INTRAVENOUS at 12:02

## 2018-08-02 RX ADMIN — HEPARIN SODIUM 2000 UNITS: 200 INJECTION, SOLUTION INTRAVENOUS at 11:31

## 2018-08-02 RX ADMIN — HEPARIN SODIUM: 1000 INJECTION, SOLUTION INTRAVENOUS; SUBCUTANEOUS at 11:31

## 2018-08-02 RX ADMIN — VERAPAMIL HYDROCHLORIDE 5 MG: 2.5 INJECTION, SOLUTION INTRAVENOUS at 11:31

## 2018-08-02 RX ADMIN — NITROGLYCERIN 10 ML: 20 INJECTION INTRAVENOUS at 11:31

## 2018-08-02 RX ADMIN — FENTANYL CITRATE 0.5 MCG: 50 INJECTION, SOLUTION INTRAMUSCULAR; INTRAVENOUS at 11:59

## 2018-08-02 ASSESSMENT — PAIN SCALES - GENERAL
PAINLEVEL_OUTOF10: 0

## 2018-08-02 NOTE — PROCEDURES
DATE OF SERVICE:  08/02/2018    REFERRING CARDIOLOGIST:  Leonides Rosario MD    PREOPERATIVE DIAGNOSES:  1.  Mitral regurgitation, probably severe.  2.  Pulmonary hypertension.    POSTOPERATIVE DIAGNOSES:  1.  Mild pulmonary hypertension.  2.  No significant mitral regurgitation.  3.  Normal left ventricular systolic function.  4.  No significant coronary artery disease.    PROCEDURES:  1.  Right and left heart catheterization with left ventriculography.  2.  Selective coronary angiography.  3.  Conscious sedation.    COMPLICATIONS:  None.    DESCRIPTION OF PROCEDURE:  After informed consent was obtained, the patient   was brought to cardiac catheterization laboratory in fasting state.  She just   underwent transesophageal echocardiography under sedation by anesthesia   earlier and was still somewhat sedated; therefore, initially we did not give her   any additional sedation.  The right wrist, right groin, and existing IV in her   right forearm were then prepped and draped in the usual sterile fashion.  We   did initially try to exchange the IV in the right forearm into the 6-Urdu   sheath, but unable to withdraw any blood return from the sheath in the right   forearm.  We also initially tried to place an arterial sheath in the right radial artery, but   unable to advance the guidewire into the radial artery.  She has history of   fracture at the right wrist/forearm in the past.  Therefore, we decided to   perform procedure via the femoral approach.    Using portable ultrasound, right femoral artery and femoral vein were   identified.  A 2% Xylocaine was used to anesthetize the area.  A 6-Urdu   sheath was placed in the right femoral vein followed by placement of 4-Urdu   in the right femoral artery using modified Seldinger technique.    Small amount of Versed and fentanyl were administered for conscious sedation.    Next, a 6-Urdu balloon-tipped thermodilution catheter was then advanced   under fluoroscopy  into the right heart.  Pulmonary pressure was measured   followed by measurement of pulmonary capillary wedge pressure.  The balloon   was then deflated and right heart pullback was then performed.  The   thermodilution catheter was then withdrawn and removed.    Next, selective angiography of the left coronary artery was performed in   multiple views using 4-Azeri JL4 catheter.  It was followed by selective   angiography of the right coronary artery using 4-Azeri 3DRC catheter.    Next, a 4-Azeri angled pigtail catheter was advanced to the left ventricle.    Left ventricular pressure was then recorded.  Left ventriculography was then   performed using 30 mL of contrast injected over 3 seconds.  Left heart   pullback was then performed.  The pigtail was then removed.  The femoral   venous and arterial sheaths were then removed.  Hemostasis was obtained using   manual compression.  The patient tolerated the procedure well.  She left   cardiac catheterization laboratory in stable condition.    I supervised monitoring conscious sedation beginning of the case around 10:22   a.m. until the end of the case at 12:02 p.m.    FINDINGS:  1.  Hemodynamics:  LV systolic pressure was 84 mmHg with LV end-diastolic   pressure of 9 mmHg.  Mean pulmonary capillary wedge pressure was 8 mmHg.    Pulmonary artery pressure was 42/20 with a mean of 28 mmHg. RV systolic was 44   mmHg with RV end-diastolic pressure of 9 mmHg.  Mean right atrial pressure was 7.    There was no gradient across the aortic valve.  Cardiac output was not   obtained because of significant tricuspid regurgitation noted on   transesophageal echocardiography.    2.  Left ventriculography showed normal-sized left ventricle with normal LV   systolic function and wall motion.  Calculated ejection fraction was 64%.    There was no significant mitral regurgitation noted.    3.  No significant coronary artery disease.    The left main is a large caliber vessel.  It is  relatively long and   angiographically free of disease.  It trifurcates into left anterior   descending artery, ramus intermediate artery and left circumflex artery.    The left anterior descending artery is a large caliber vessel.  It extends   slightly beyond the apex.  It gives rises to one medium-sized diagonal branch   proximally and one small diagonal branch distally. There was no significant   disease noted in the left anterior descending artery or its major branches.    Antegrade flow was normal.    Ramus intermediate artery is medium sized and is disease free.    Left circumflex artery is a medium to large in size.  It gives off one  medium-sized obtuse marginal branch distally.  There is a 20% stenosis in the   mid portion of left circumflex artery. There is no significant disease seen.    Antegrade flow was normal.    Right coronary artery is a dominant system.  The right coronary is a large   caliber vessel. It gives off several small acute marginal branches, medium-sized   posterior descending artery and posterolateral branch.  There is no   significant disease in the right coronary or its major branches.    PLAN:  Bed rest for 4 hours.  No lifting more than 5 pounds for 4-5 days.    Medical therapy for pulmonary hypertension which is more likely from pulmonary   disease.       ____________________________________     MD ANTWAN CAMPBELL / DAVID    DD:  08/02/2018 13:12:35  DT:  08/02/2018 14:45:39    D#:  [id number]  Job#:  [id number]

## 2018-08-02 NOTE — OR NURSING
1224: Patient from cath lab to PPU via gurney. Patient is awake and on bedrest/flat X 4 hours. VSS. RLE CMS intact. R femoral incision site soft and dressing clean, dry and intact. Vascular access care management per MD order (see assessment). No c/o pain or nausea at this time. Will monitor closely. Vital signs per MD order.   1300: VSS.  Niece at bedside. Patient tolerated PO intake. R groin intact.   1330: R groin intact. VSS.   1535: DC instructions given. Questions answered. Family at bedside. R groin soft,CDI. No c/o pain or nausea. Patient wide awake. VSS. Patient met criteria for discharge.

## 2018-08-02 NOTE — CATH LAB
Immediate Post-Operative Note      PreOp Diagnosis: MR prob severe, pulm HTN    PostOp Diagnosis: No sig MR or CAD, pulm HTN    Procedure(s) :  Coronary Angiography, Right and Left Heart Catheterization, Left Ventriculography    Surgeon(s):  Huong Garcia M.D.    Type of Anesthesia: Moderate Sedation    Specimen: None    Estimated Blood Loss: 10 cc's      Findings: PCWP mean 8, PA systolic mid 40, no sig MR, no CAD, normal LVSF    Complications: none      Huong Garcia M.D.  8/2/2018 12:12 PM

## 2018-08-02 NOTE — PROGRESS NOTES
CATH LAB NOTE:  PT HAD STEPHANIE AND WENT STRAIGHT INTO CATH LAB, PT SPOKE WITH BOTH DOCTORS DOING PROCEDURES AND SIGNED CONSENTS, QUESTIONS ANSWERED.  REPORT GIVEN TO CHERYL CATH LAB RN.

## 2018-08-02 NOTE — DISCHARGE INSTRUCTIONS
ACTIVITY: Rest and take it easy for the first 24 hours.  A responsible adult is recommended to remain with you during that time.  It is normal to feel sleepy.  We encourage you to not do anything that requires balance, judgment or coordination.    MILD FLU-LIKE SYMPTOMS ARE NORMAL. YOU MAY EXPERIENCE GENERALIZED MUSCLE ACHES, THROAT IRRITATION, HEADACHE AND/OR SOME NAUSEA.    FOR 24 HOURS DO NOT:  Drive, operate machinery or run household appliances.  Drink beer or alcoholic beverages.   Make important decisions or sign legal documents.      DIET: To avoid nausea, slowly advance diet as tolerated, avoiding spicy or greasy foods for the first day.  Add more substantial food to your diet according to your physician's instructions.  Babies can be fed formula or breast milk as soon as they are hungry.  INCREASE FLUIDS AND FIBER TO AVOID CONSTIPATION.    SURGICAL DRESSING/BATHING:      Keep the dressing clean and dry for 24 hours.  May remove dressing tomorrow  and can shower.  Do not submerge site under water for 1 week.  No heavy lifting and limit movement for 2 days.      FOLLOW-UP APPOINTMENT:  A follow-up appointment should be arranged with your doctor ; call to schedule.    You should CALL YOUR PHYSICIAN if you develop:  Fever greater than 101 degrees F.  Pain not relieved by medication, or persistent nausea or vomiting.  Excessive bleeding (blood soaking through dressing) or unexpected drainage from the wound.  Extreme redness or swelling around the incision site, drainage of pus or foul smelling drainage.  Inability to urinate or empty your bladder within 8 hours.  Problems with breathing or chest pain.    You should call 911 if you develop problems with breathing or chest pain.  If you are unable to contact your doctor or surgical center, you should go to the nearest emergency room or urgent care center.      Physician's telephone #: [phone]    If any questions arise, call your doctor.  If your doctor is not  available, please feel free to call the Surgical Center at [phone].  The Center is open Monday through Friday from 7AM to 7PM.  You can also call the HEALTH HOTLINE open 24 hours/day, 7 days/week and speak to a nurse at [phone], or toll free at [phone].    A registered nurse may call you a few days after your surgery to see how you are doing after your procedure.    MEDICATIONS: Resume taking daily medication.  Take prescribed pain medication with food.  If no medication is prescribed, you may take non-aspirin pain medication if needed.  PAIN MEDICATION CAN BE VERY CONSTIPATING.  Take a stool softener or laxative such as senokot, pericolace, or milk of magnesia if needed.      If your physician has prescribed pain medication that includes Acetaminophen (Tylenol), do not take additional Acetaminophen (Tylenol) while taking the prescribed medication.    Depression / Suicide Risk    As you are discharged from this [facility] facility, it is important to learn how to keep safe from harming yourself.    Recognize the warning signs:  · Abrupt changes in personality, positive or negative- including increase in energy   · Giving away possessions  · Change in eating patterns- significant weight changes-  positive or negative  · Change in sleeping patterns- unable to sleep or sleeping all the time   · Unwillingness or inability to communicate  · Depression  · Unusual sadness, discouragement and loneliness  · Talk of wanting to die  · Neglect of personal appearance   · Rebelliousness- reckless behavior  · Withdrawal from people/activities they love  · Confusion- inability to concentrate     If you or a loved one observes any of these behaviors or has concerns about self-harm, here's what you can do:  · Talk about it- your feelings and reasons for harming yourself  · Remove any means that you might use to hurt yourself (examples: pills, rope, extension cords, firearm)  · Get professional help from the  "community (Mental Health, Substance Abuse, psychological counseling)  · Do not be alone:Call your Safe Contact- someone whom you trust who will be there for you.  · Call your local CRISIS HOTLINE 025-6504 or 200-221-7927  · Call your local Children's Mobile Crisis Response Team [place] (191) 879-1931 or www.LocalMed  · Call the toll free National Suicide Prevention Hotlines   · National Suicide Prevention Lifeline 417-915-NYPO (3650)  · National Hope Line Network 800-SUICIDE (923-3108)              Post Angiogram Groin Care Instructions     INSTRUCTIONS  2. Examine (look and feel) the site of your incision site TODAY so you can recognize changes that should be called to your doctor (see below).  3. Avoid straining either by lifting or pulling objects for 4-5 days. Avoid lifting over 5 pounds.   4. For at least 72 hours, if you should sneeze or cough, please hold pressure over your groin area.  5. If you should begin to have oozing from the catheterization site, please hold firm pressure and call your doctor's office immediately.  6. If profuse bleeding occurs from the catheterization site, hold firm pressure and call \"[phone]\" immediately for assistance.  7. Remove bandage after 24 hours.     ACTIVITY  2. Limit activity as instructed by your doctor.  3. No driving or very limited driving with frequent stops for one week.   4. If you must take a long car ride, stop every hour and walk around the car.   5. Warm showers or baths are permitted after the bandage is removed. Avoid hot showers, baths, hot tubs, and swimming for one week.    PLEASE CALL YOUR DOCTOR IF:  1. Temperature elevation occurs.  2. Catheterization site becomes reddened or begins to drain.   3. Bruising appears to be new or not resolving. The bruise may move down your leg. This is normal.  4. The small round lump in the groin increases in size.  5. Any leg numbness, aching, or discomfort (immediately).  6. Increasing discomfort in the leg " at the insertion site.  7. Chest pains, even if relieved by Nitroglycerin.    MISCELLANEOUS INSTRUCTIONS  1. Bruising may occur as a result of heart catheterization. Some of the discoloration may travel down the leg, going from blue to green in color.  2. A small round lump under the catheterization site will remain for up to six weeks.  3. If any questions arise call your physician's office. You can also call the [facility] HOTLINE open 24 hours/day, 7 days/week and speak to a nurse at [phone], or toll free at [phone].   4. You should call 911 if you develop problems with breathing or chest pain.      I acknowledge receipt and understanding of these Home Care instructions.                Transesophageal Echocardiogram    Transesophageal echocardiography (STEPHANIE) is a picture test of your heart using sound waves. The pictures taken can give very detailed pictures of your heart. This can help your doctor see if there are problems with your heart. STEPHANIE can check:  · If your heart has blood clots in it.  · How well your heart valves are working.  · If you have an infection on the inside of your heart.  · Some of the major arteries of your heart.  · If your heart valve is working after a repair.  · Your heart before a procedure that uses a shock to your heart to get the rhythm back to normal.  What happens before the procedure?  · Do not eat or drink for 6 hours before the procedure or as told by your doctor.  · Make plans to have someone drive you home after the procedure. Do not drive yourself home.  · An IV tube will be put in your arm.  What happens during the procedure?  · You will be given a medicine to help you relax (sedative). It will be given through the IV tube.  · A numbing medicine will be sprayed or gargled in the back of your throat to help numb it.  · The tip of the probe is placed into the back of your mouth. You will be asked to swallow. This helps to pass the probe into your esophagus.  · Once  the tip of the probe is in the right place, your doctor can take pictures of your heart.  · You may feel pressure at the back of your throat.  What happens after the procedure?  · You will be taken to a recovery area so the sedative can wear off.  · Your throat may be sore and scratchy. This will go away slowly over time.  · You will go home when you are fully awake and able to swallow liquids.  · You should have someone stay with you for the next 24 hours.  · Do not drive or operate machinery for the next 24 hours.  This information is not intended to replace advice given to you by your health care provider. Make sure you discuss any questions you have with your health care provider.  Document Released: 10/15/2010 Document Revised: 05/25/2017 Document Reviewed: 06/19/2014  Elsevier Interactive Patient Education © 2017 [employer]

## 2018-08-20 ENCOUNTER — TELEPHONE (OUTPATIENT)
Dept: CARDIOLOGY | Facility: MEDICAL CENTER | Age: 83
End: 2018-08-20

## 2018-08-20 NOTE — TELEPHONE ENCOUNTER
STEPHANIE showed severe TR, no MR noted. She needs to come in to the office to go over plan with ALEX. We don't have TR clip in place yet. Refer to different center or wait this procedure to become available? SC

## 2018-08-20 NOTE — TELEPHONE ENCOUNTER
Questions   Received: Today   Message Contents   Jarret Rueda, Cm Ass't  KSAANDRA Mon   AB   JW     Raysa Ramirez/[facility] is calling regarding Pt. She says Pt is not sure if we where going to do mitral valve repair or not. Raysa would like a call back at: [phone] ext [phone].     Thank you so much,     Jarret      Discussed the plan of care with Valve program coordinators Oliva SHEFFIELD and Linda FRANCO. Pt needs to be seen in clinic with Dr. Rosario to discuss plan of care but it does not appear that pt is a Mitraclip candidate per conversation.    Called and left message notifying Raysa that pt will be scheduled for a follow appt w/ Dr. Rosario to discuss plan of care.    Pt currently has a follow-up with ALEX 9/27. Donovan Orlando to call and see if pt would like to be seen sooner.

## 2018-08-29 ENCOUNTER — TELEPHONE (OUTPATIENT)
Dept: CARDIOLOGY | Facility: MEDICAL CENTER | Age: 83
End: 2018-08-29

## 2018-08-29 NOTE — TELEPHONE ENCOUNTER
FW: Is appt needed   Received: Yesterday   Message Contents               Previous Messages      ----- Message -----   From: Kiko Willignham, Med Ass't   Sent: 8/28/2018  11:49 AM   To: Susanne Blanchard R.N.   Subject: Is appt needed                                   ALEX Alford pt's daughter Hannah called the call center and wanted to know if appt on 9/27/18 is needed as the TAVR was not done in August. She would like a call back at the earliest convenience.       Thanks,   Kiko [phone]         Called and s/w the pt. Explained that the appt with ALEX is very important to discuss plan of care and she should keep it. Explained SC's note from 8/20 regarding STEPHANIE results. Pt appreciated and agreed but thought it would be a good idea to call her dtr.     Also called Hannah ([phone]) and explained. She agreed and will come with pt to appt 9/27.

## 2018-09-27 ENCOUNTER — OFFICE VISIT (OUTPATIENT)
Dept: CARDIOLOGY | Facility: MEDICAL CENTER | Age: 83
End: 2018-09-27
Payer: MEDICARE

## 2018-09-27 VITALS
BODY MASS INDEX: 15 KG/M2 | HEART RATE: 64 BPM | DIASTOLIC BLOOD PRESSURE: 82 MMHG | WEIGHT: 98.99 LBS | HEIGHT: 68 IN | SYSTOLIC BLOOD PRESSURE: 130 MMHG | OXYGEN SATURATION: 91 %

## 2018-09-27 DIAGNOSIS — I27.20 PULMONARY HYPERTENSION (HCC): ICD-10-CM

## 2018-09-27 DIAGNOSIS — I07.1 SEVERE TRICUSPID REGURGITATION: ICD-10-CM

## 2018-09-27 PROBLEM — I34.0 SEVERE MITRAL REGURGITATION: Status: RESOLVED | Noted: 2018-07-26 | Resolved: 2018-09-27

## 2018-09-27 PROCEDURE — 99214 OFFICE O/P EST MOD 30 MIN: CPT | Performed by: INTERNAL MEDICINE

## 2018-09-27 ASSESSMENT — ENCOUNTER SYMPTOMS
FEVER: 0
INSOMNIA: 0
DIZZINESS: 0
ABDOMINAL PAIN: 0
LOSS OF CONSCIOUSNESS: 0
CHILLS: 0
PALPITATIONS: 0
ORTHOPNEA: 0
MYALGIAS: 0
SHORTNESS OF BREATH: 1
PND: 0
BLURRED VISION: 0

## 2018-09-27 NOTE — PROGRESS NOTES
No chief complaint on file.      Subjective:   Janet Bartlett is a 89 y.o. female who presents today for follow up of valvular heart disease and study result review.    Since the discharge from [facility] on 08/02/18 status post cardiac catheterization, she has continued to experience mild fatigue, mild shortness of breath, dyspnea on exertion and dizziness. She denies chest pain or syncope.    Past Medical History:   Diagnosis Date   • Abdominal aortic aneurysm (AAA) without rupture ([facility])     Small saccular aneurysm distal abdominal aorta    • Aortic dissection (HCC)     Pool type B, healed as of last computed tomography scan in 2014.   • Aspiration pneumonia ([facility]) 5/2016   • Bronchiectasis ([facility])    • Empyema ([facility]) 5/2016    Left hemithorax, successfully drained and treated   • Hypercholesteremia    • Hypertension    • LVH (left ventricular hypertrophy) 2012    Anterior Q waves, probably due to LVH, normal wall motion and LVH confirmed by echo.    • Mitral regurgitation 06/2018    Echocardiogram with normal LV size, LVEF 65%. Severely increased RA. Moderate-severe MR. Moderate TR.   • Nonspecific abnormal electrocardiogram (ECG) (EKG)     See above, pattern stable.   • Pulmonary hypertension ([facility])    • Respiratory failure ([facility]) 5/2016   • Sepsis ([facility]) 5/2016   • Tricuspid regurgitation 06/2018    Echocardiogram with moderate TR.   • Valvular heart disease      Past Surgical History:   Procedure Laterality Date   • THORACOTOMY Left 5/27/2016    Procedure: THORACOTOMY Mini with;  Surgeon: Jose Lafleur M.D.;  Location: SURGERY [facility];  Service:    • THORACOSCOPY Left 5/27/2016    Procedure: THORACOSCOPY;  Surgeon: Jose Lafleur M.D.;  Location: SURGERY [facility];  Service:    • HIP ARTHROPLASTY TOTAL Left     Prior to the above.   • OTHER ORTHOPEDIC SURGERY  open fracture reduction    Left arm     Family History   Problem Relation Age of Onset   • Heart Attack Mother    • Heart  "Attack Father    • Other Daughter         Multiple Sclerosis     Social History     Social History   • Marital status: Single     Spouse name: N/A   • Number of children: N/A   • Years of education: N/A     Occupational History   • Not on file.     Social History Main Topics   • Smoking status: Never Smoker   • Smokeless tobacco: Never Used   • Alcohol use No   • Drug use: No   • Sexual activity: Not on file     Other Topics Concern   • Not on file     Social History Narrative   • No narrative on file     Allergies   Allergen Reactions   • Penicillins Hives     Medications reviewed.    Outpatient Encounter Prescriptions as of 9/27/2018   Medication Sig Dispense Refill   • metoprolol SR (TOPROL XL) 25 MG TABLET SR 24 HR Take 0.5 Tabs by mouth every day. 15 Tab 6   • Ibuprofen (ADVIL) 200 MG Cap Take  by mouth.     • aspirin (ASA) 81 MG Chew Tab chewable tablet 1 Tab by Per NG Tube route every day. 100 Tab 11     No facility-administered encounter medications on file as of 9/27/2018.      Review of Systems   Constitutional: Negative for chills and fever.   HENT: Negative for congestion.    Eyes: Negative for blurred vision.   Respiratory: Positive for shortness of breath.    Cardiovascular: Positive for leg swelling. Negative for chest pain, palpitations, orthopnea and PND.   Gastrointestinal: Negative for abdominal pain.   Genitourinary: Negative for dysuria.   Musculoskeletal: Negative for joint pain and myalgias.   Skin: Negative for rash.   Neurological: Negative for dizziness and loss of consciousness.   Psychiatric/Behavioral: The patient does not have insomnia.         Objective:   /82 (BP Location: Left arm, Patient Position: Sitting, BP Cuff Size: Adult)   Pulse 64   Ht 1.727 m (5' 8\")   Wt 44.9 kg (98 lb 15.8 oz)   SpO2 91%   BMI 15.05 kg/m²     Physical Exam   Constitutional: She is oriented to person, place, and time. She appears cachectic.   Using oxygen.   HENT:   Head: Normocephalic and " atraumatic.   Eyes: Pupils are equal, round, and reactive to light. Conjunctivae are normal.   Neck: Normal range of motion. Neck supple.   Cardiovascular: Normal rate and regular rhythm.    Murmur heard.  Pulmonary/Chest: Effort normal and breath sounds normal.   Abdominal: Soft. Bowel sounds are normal.   Musculoskeletal: Normal range of motion.   Neurological: She is alert and oriented to person, place, and time.   Skin: Skin is warm and dry.   Psychiatric: She has a normal mood and affect.     CARDIAC STUDIES/PROCEDURES:    CARDIAC CATHETERIZATION CONCLUSIONS by Dr. Garcia (08/02/18)  1.  Mild pulmonary hypertension.  2.  No significant mitral regurgitation.  3.  Normal left ventricular systolic function.  4.  No significant coronary artery disease.  (study result reviewed)    CAROTID ULTRASOUND (07/26/18)  Mild bilateral internal carotid artery stenosis (<50%).   (study result reviewed)     CTA OF CHEST (04/24/14)  CT scan demonstrates a dissection, which begins distal to the subclavian artery and   continues to the level the thoracoabdominal junction.  It appears as if the   dissection is closing with luminal hematoma proximally but still has a patent   dissection tract in the distal thoracic aorta.  The maximal size of the   thoracic aorta is 2.9 cm and the arch 3.2 cm.     ECHOCARDIOGRAM CONCLUSIONS at [facility] (06/07/18)  Echocardiogram showing normal left ventricular systolic function, ejection fraction of 65%, severe mitral regurgitation and estimated right ventricular systolic pressure of 65 mmHg.     EKG performed on (07/26/18) EKG shows sinus rhythm with anterior Q waves.     Laboratory results of (07/26/18) were reviewed. Bun of 18 mg/dl, creatinine levels of 0.63 mg/dl noted.  Laboratory results of (06/06/16) Bun of 15 mg/dl, creatinine levels of 0.36 mg/dl noted.    TRANSESOPHAGEAL ECHOCARDIOGRAM CONCLUSIONS by Dr. Garcia (08/02/18)  Severe tricuspid  regurgitation.  Severely dilated right ventricle.  Reduced right ventricular systolic function.  (study result reviewed)    Assessment:     1. Severe tricuspid regurgitation     2. Pulmonary hypertension (HCC)       Medical Decision Making:  Today's Assessment / Status / Plan:     1. Severe tricuspid regurgitation: Her cardiac studies demonstrated severe tricuspid regurgitation as opposed to severe mitral regurgitation. .We will follow up in six months and await for approval of tricuspid valve clip.  2. Mild pulmonary hypertension per cardiac catheterization.  3. Thoracic aortic dissection (Pool type B): Medically managed by Dr. Tan  4. Chronic obstructive pulmonary disease on chronic oxygen therapy (2 L/min for half year): Managed by Dr. Ko.  5. Additional information: Her and her family lives in [place].    We will follow up in six months with Juancarlos Henderson in [facility]..    CC Tanesha Kelly and Linda Early

## 2018-12-13 ENCOUNTER — OFFICE VISIT (OUTPATIENT)
Dept: CARDIOLOGY | Facility: PHYSICIAN GROUP | Age: 83
End: 2018-12-13
Payer: MEDICARE

## 2018-12-13 VITALS
BODY MASS INDEX: 15.16 KG/M2 | DIASTOLIC BLOOD PRESSURE: 100 MMHG | OXYGEN SATURATION: 94 % | HEIGHT: 68 IN | SYSTOLIC BLOOD PRESSURE: 160 MMHG | WEIGHT: 100 LBS | HEART RATE: 92 BPM

## 2018-12-13 DIAGNOSIS — I10 ESSENTIAL HYPERTENSION, BENIGN: ICD-10-CM

## 2018-12-13 DIAGNOSIS — I71.019 DISSECTION OF THORACIC AORTA (HCC): ICD-10-CM

## 2018-12-13 DIAGNOSIS — I07.1 SEVERE TRICUSPID REGURGITATION: ICD-10-CM

## 2018-12-13 DIAGNOSIS — I71.40 ABDOMINAL AORTIC ANEURYSM (AAA) WITHOUT RUPTURE (HCC): ICD-10-CM

## 2018-12-13 DIAGNOSIS — I27.20 PULMONARY HYPERTENSION (HCC): ICD-10-CM

## 2018-12-13 PROCEDURE — 99214 OFFICE O/P EST MOD 30 MIN: CPT | Performed by: INTERNAL MEDICINE

## 2018-12-13 RX ORDER — PREDNISONE 20 MG/1
20 TABLET ORAL DAILY
COMMUNITY

## 2018-12-13 RX ORDER — BENZONATATE 100 MG/1
100 CAPSULE ORAL 3 TIMES DAILY PRN
COMMUNITY
End: 2019-06-03

## 2018-12-13 NOTE — LETTER
[facility] [address]  Phone: [phone]  Fax: [phone]              Janet Bartlett  11/24/1927    Encounter Date: 12/13/2018    Juancarlos Graves M.D.          PROGRESS NOTE:  Chief Complaint   Patient presents with   • Hypertension       Subjective:   Janet Bartlett is a 89 y.o. female who presents today for follow-up of the concern of mitral regurgitation and advanced age with a prior history of aortic dissection    She saw the valve team and underwent a STEPHANIE which did not confirm the mitral regurgitation but rather just showed severe tricuspid regurgitation    She still suffers terribly from right hip arthritis she did have a left hip surgery without complication about 4 5 years ago    Past Medical History:   Diagnosis Date   • Abdominal aortic aneurysm (AAA) without rupture (HCC)     Small saccular aneurysm distal abdominal aorta    • Aortic dissection (HCC)     [place] type B, healed as of last computed tomography scan in 2014.   • Aspiration pneumonia ([facility]) 5/2016   • Bronchiectasis ([facility])    • Empyema ([facility]) 5/2016    Left hemithorax, successfully drained and treated   • Hypercholesteremia    • Hypertension    • LVH (left ventricular hypertrophy) 2012    Anterior Q waves, probably due to LVH, normal wall motion and LVH confirmed by echo.    • Mitral regurgitation 06/2018    Echocardiogram with normal LV size, LVEF 65%. Severely increased RA. Moderate-severe MR. Moderate TR.   • Nonspecific abnormal electrocardiogram (ECG) (EKG)     See above, pattern stable.   • Pulmonary hypertension (HCC)    • Respiratory failure ([facility]) 5/2016   • Sepsis ([facility]) 5/2016   • Tricuspid regurgitation 06/2018    Echocardiogram with moderate TR.   • Valvular heart disease      Past Surgical History:   Procedure Laterality Date   • THORACOTOMY Left 5/27/2016    Procedure: THORACOTOMY Mini with;  Surgeon: Jose Lafleur M.D.;   Location: SURGERY [facility];  Service:    • THORACOSCOPY Left 5/27/2016    Procedure: THORACOSCOPY;  Surgeon: Jose Lafleur M.D.;  Location: SURGERY [facility];  Service:    • HIP ARTHROPLASTY TOTAL Left     Prior to the above.   • OTHER ORTHOPEDIC SURGERY  open fracture reduction    Left arm     Family History   Problem Relation Age of Onset   • Heart Attack Mother    • Heart Attack Father    • Other Daughter         Multiple Sclerosis     Social History     Social History   • Marital status: Single     Spouse name: N/A   • Number of children: N/A   • Years of education: N/A     Occupational History   • Not on file.     Social History Main Topics   • Smoking status: Never Smoker   • Smokeless tobacco: Never Used   • Alcohol use No   • Drug use: No   • Sexual activity: Not on file     Other Topics Concern   • Not on file     Social History Narrative   • No narrative on file     Allergies   Allergen Reactions   • Penicillins Hives     Outpatient Encounter Prescriptions as of 12/13/2018   Medication Sig Dispense Refill   • predniSONE (DELTASONE) 20 MG Tab Take 20 mg by mouth every day.     • benzonatate (TESSALON) 100 MG Cap Take 100 mg by mouth 3 times a day as needed for Cough.     • metoprolol SR (TOPROL XL) 25 MG TABLET SR 24 HR Take 0.5 Tabs by mouth every day. 15 Tab 6   • aspirin (ASA) 81 MG Chew Tab chewable tablet 1 Tab by Per NG Tube route every day. 100 Tab 11   • [DISCONTINUED] budesonide-formoterol (SYMBICORT) 80-4.5 MCG/ACT Aerosol Inhale 2 Puffs by mouth 2 Times a Day. Use spacer. Rinse mouth after each use. (Patient not taking: Reported on 7/26/2018) 1 Inhaler 11   • Ibuprofen (ADVIL) 200 MG Cap Take  by mouth.     • [DISCONTINUED] acetaminophen 650 MG Tab 650 mg by Per NG Tube route every 6 hours as needed (Mild Pain; (Pain scale 1-3); Temp greater than 100.5 F). 30 Tab 0     No facility-administered encounter medications on file as of 12/13/2018.      Review of Systems   Constitutional:  "Negative for chills and fever.   HENT: Negative for sore throat.    Eyes: Negative for blurred vision.   Respiratory: Negative for cough and shortness of breath.    Cardiovascular: Negative for chest pain, palpitations, claudication, leg swelling and PND.   Gastrointestinal: Negative for abdominal pain and nausea.   Musculoskeletal: Negative for falls and joint pain.   Skin: Negative for rash.   Neurological: Negative for dizziness, focal weakness and weakness.   Endo/Heme/Allergies: Does not bruise/bleed easily.        Objective:   /100 (BP Location: Left arm, Patient Position: Sitting, BP Cuff Size: Adult)   Pulse 92   Ht 1.727 m (5' 8\")   Wt 45.4 kg (100 lb)   SpO2 94%   BMI 15.20 kg/m²      Physical Exam   Constitutional: No distress.   She walks with a remarkable limp favoring the right   HENT:   Mouth/Throat: Oropharynx is clear and moist. No oropharyngeal exudate.   Eyes: No scleral icterus.   Neck: No JVD present.   Cardiovascular: Normal rate.  Exam reveals no gallop and no friction rub.    Murmur heard.  Pulmonary/Chest: No respiratory distress. She has no wheezes. She has no rales.   Abdominal: Soft. Bowel sounds are normal.   Musculoskeletal: She exhibits no edema.   Neurological: She is alert.   Skin: No rash noted. She is not diaphoretic.   Psychiatric: She has a normal mood and affect.     Reviewed the images of her STEPHANIE which showed severe tricuspid regurgitation but no significant mitral regurgitation    Assessment:     1. Abdominal aortic aneurysm (AAA) without rupture (HCC)     2. Dissection of thoracic aorta (HCC)     3. Severe tricuspid regurgitation     4. Pulmonary hypertension (HCC)     5. Essential hypertension, benign         Medical Decision Making:  Today's Assessment / Status / Plan:     It was my pleasure to meet with Ms. Bartlett.    For her severe tricuspid regurgitation this is likely due to long-term lung disease and pulmonary hypertension related to that she is well " compensated and has been stable weight for a long time so I do not see a major issue and she would not require any further surgeries for this    I believe for her right hip although she has advanced age and does have severe valve disease of the right but that is well compensated that she could proceed from a cardiovascular standpoint she does have pretty significant lung disease which will complicate things but it seems that her right hip is painful enough and debilitating enough that it may be worthwhile to pursue replacement encouraged follow-up with orthopedics    She is done well with conservative therapy for history of type B dissection    I will see Ms. Bartlett back in 6 months time and encouraged her to follow up with us over the phone or e-mail using my MyChart as issues arise.    It is my pleasure to participate in the care of Ms. Bartlett.  Please do not hesitate to contact me with questions or concerns.    Juancarlos Graves MD PhD [facility]  Cardiologist [facility]        MARY Aden  [address] [address]  VIA Facsimile: [phone]     Lexx Gay M.D.  [address] [facility] [address]  VIA Facsimile: [phone]

## 2018-12-13 NOTE — LETTER
PROCEDURE/SURGERY CLEARANCE FORM      Encounter Date: 12/13/2018    Patient: Janet Bartlett  YOB: 1927    CARDIOLOGIST:  Jauncarlos Graves M.D.    REFERRING DOCTOR:  Dr Gay      The above patient is cleared to have the following procedure/surgery: right hip surgery    Additional comments: she can safely hold aspirin.  Obviously given her age surgery high risk but acceptable her heart condition is overall stable and she has done well.      It is my pleasure to participate in the care of Ms. Bartlett.  Please do not hesitate to contact me with questions or concerns. [facility] is available 24/7 for consultative services at [phone] in the perioperative period.    Electronically Signed    Juancarlos Graves MD PhD FACC  Cardiologist [facility]

## 2018-12-14 ASSESSMENT — ENCOUNTER SYMPTOMS
BLURRED VISION: 0
FOCAL WEAKNESS: 0
CHILLS: 0
SHORTNESS OF BREATH: 0
PALPITATIONS: 0
CLAUDICATION: 0
BRUISES/BLEEDS EASILY: 0
WEAKNESS: 0
ABDOMINAL PAIN: 0
FALLS: 0
FEVER: 0
COUGH: 0
PND: 0
SORE THROAT: 0
DIZZINESS: 0
NAUSEA: 0

## 2018-12-15 NOTE — PROGRESS NOTES
Chief Complaint   Patient presents with   • Hypertension       Subjective:   Janet Bartlett is a 89 y.o. female who presents today for follow-up of the concern of mitral regurgitation and advanced age with a prior history of aortic dissection    She saw the valve team and underwent a STEPHANIE which did not confirm the mitral regurgitation but rather just showed severe tricuspid regurgitation    She still suffers terribly from right hip arthritis she did have a left hip surgery without complication about 4 5 years ago    Past Medical History:   Diagnosis Date   • Abdominal aortic aneurysm (AAA) without rupture (HCC)     Small saccular aneurysm distal abdominal aorta    • Aortic dissection (HCC)     Pool type B, healed as of last computed tomography scan in 2014.   • Aspiration pneumonia ([facility]) 5/2016   • Bronchiectasis ([facility])    • Empyema ([facility]) 5/2016    Left hemithorax, successfully drained and treated   • Hypercholesteremia    • Hypertension    • LVH (left ventricular hypertrophy) 2012    Anterior Q waves, probably due to LVH, normal wall motion and LVH confirmed by echo.    • Mitral regurgitation 06/2018    Echocardiogram with normal LV size, LVEF 65%. Severely increased RA. Moderate-severe MR. Moderate TR.   • Nonspecific abnormal electrocardiogram (ECG) (EKG)     See above, pattern stable.   • Pulmonary hypertension ([facility])    • Respiratory failure ([facility]) 5/2016   • Sepsis ([facility]) 5/2016   • Tricuspid regurgitation 06/2018    Echocardiogram with moderate TR.   • Valvular heart disease      Past Surgical History:   Procedure Laterality Date   • THORACOTOMY Left 5/27/2016    Procedure: THORACOTOMY Mini with;  Surgeon: Jose Lafleur M.D.;  Location: SURGERY [facility];  Service:    • THORACOSCOPY Left 5/27/2016    Procedure: THORACOSCOPY;  Surgeon: Jose Lafleur M.D.;  Location: SURGERY [facility];  Service:    • HIP ARTHROPLASTY TOTAL Left     Prior to the above.   • OTHER ORTHOPEDIC SURGERY  open  fracture reduction    Left arm     Family History   Problem Relation Age of Onset   • Heart Attack Mother    • Heart Attack Father    • Other Daughter         Multiple Sclerosis     Social History     Social History   • Marital status: Single     Spouse name: N/A   • Number of children: N/A   • Years of education: N/A     Occupational History   • Not on file.     Social History Main Topics   • Smoking status: Never Smoker   • Smokeless tobacco: Never Used   • Alcohol use No   • Drug use: No   • Sexual activity: Not on file     Other Topics Concern   • Not on file     Social History Narrative   • No narrative on file     Allergies   Allergen Reactions   • Penicillins Hives     Outpatient Encounter Prescriptions as of 12/13/2018   Medication Sig Dispense Refill   • predniSONE (DELTASONE) 20 MG Tab Take 20 mg by mouth every day.     • benzonatate (TESSALON) 100 MG Cap Take 100 mg by mouth 3 times a day as needed for Cough.     • metoprolol SR (TOPROL XL) 25 MG TABLET SR 24 HR Take 0.5 Tabs by mouth every day. 15 Tab 6   • aspirin (ASA) 81 MG Chew Tab chewable tablet 1 Tab by Per NG Tube route every day. 100 Tab 11   • [DISCONTINUED] budesonide-formoterol (SYMBICORT) 80-4.5 MCG/ACT Aerosol Inhale 2 Puffs by mouth 2 Times a Day. Use spacer. Rinse mouth after each use. (Patient not taking: Reported on 7/26/2018) 1 Inhaler 11   • Ibuprofen (ADVIL) 200 MG Cap Take  by mouth.     • [DISCONTINUED] acetaminophen 650 MG Tab 650 mg by Per NG Tube route every 6 hours as needed (Mild Pain; (Pain scale 1-3); Temp greater than 100.5 F). 30 Tab 0     No facility-administered encounter medications on file as of 12/13/2018.      Review of Systems   Constitutional: Negative for chills and fever.   HENT: Negative for sore throat.    Eyes: Negative for blurred vision.   Respiratory: Negative for cough and shortness of breath.    Cardiovascular: Negative for chest pain, palpitations, claudication, leg swelling and PND.   Gastrointestinal:  "Negative for abdominal pain and nausea.   Musculoskeletal: Negative for falls and joint pain.   Skin: Negative for rash.   Neurological: Negative for dizziness, focal weakness and weakness.   Endo/Heme/Allergies: Does not bruise/bleed easily.        Objective:   /100 (BP Location: Left arm, Patient Position: Sitting, BP Cuff Size: Adult)   Pulse 92   Ht 1.727 m (5' 8\")   Wt 45.4 kg (100 lb)   SpO2 94%   BMI 15.20 kg/m²     Physical Exam   Constitutional: No distress.   She walks with a remarkable limp favoring the right   HENT:   Mouth/Throat: Oropharynx is clear and moist. No oropharyngeal exudate.   Eyes: No scleral icterus.   Neck: No JVD present.   Cardiovascular: Normal rate.  Exam reveals no gallop and no friction rub.    Murmur heard.  Pulmonary/Chest: No respiratory distress. She has no wheezes. She has no rales.   Abdominal: Soft. Bowel sounds are normal.   Musculoskeletal: She exhibits no edema.   Neurological: She is alert.   Skin: No rash noted. She is not diaphoretic.   Psychiatric: She has a normal mood and affect.     Reviewed the images of her STEPHANIE which showed severe tricuspid regurgitation but no significant mitral regurgitation    Assessment:     1. Abdominal aortic aneurysm (AAA) without rupture (HCC)     2. Dissection of thoracic aorta (HCC)     3. Severe tricuspid regurgitation     4. Pulmonary hypertension (HCC)     5. Essential hypertension, benign         Medical Decision Making:  Today's Assessment / Status / Plan:     It was my pleasure to meet with Ms. Bartlett.    For her severe tricuspid regurgitation this is likely due to long-term lung disease and pulmonary hypertension related to that she is well compensated and has been stable weight for a long time so I do not see a major issue and she would not require any further surgeries for this    I believe for her right hip although she has advanced age and does have severe valve disease of the right but that is well compensated that " she could proceed from a cardiovascular standpoint she does have pretty significant lung disease which will complicate things but it seems that her right hip is painful enough and debilitating enough that it may be worthwhile to pursue replacement encouraged follow-up with orthopedics    She is done well with conservative therapy for history of type B dissection    I will see Ms. Bartlett back in 6 months time and encouraged her to follow up with us over the phone or e-mail using my MyChart as issues arise.    It is my pleasure to participate in the care of Ms. Bartlett.  Please do not hesitate to contact me with questions or concerns.    Juancarlos Graves MD PhD FACC  Cardiologist [facility]

## 2019-06-03 ENCOUNTER — OFFICE VISIT (OUTPATIENT)
Dept: CARDIOLOGY | Facility: PHYSICIAN GROUP | Age: 84
End: 2019-06-03
Payer: MEDICARE

## 2019-06-03 VITALS
SYSTOLIC BLOOD PRESSURE: 104 MMHG | OXYGEN SATURATION: 89 % | BODY MASS INDEX: 14.4 KG/M2 | DIASTOLIC BLOOD PRESSURE: 80 MMHG | HEART RATE: 93 BPM | HEIGHT: 68 IN | WEIGHT: 95 LBS

## 2019-06-03 DIAGNOSIS — R00.0 SINUS TACHYCARDIA: ICD-10-CM

## 2019-06-03 DIAGNOSIS — I34.0 NON-RHEUMATIC MITRAL REGURGITATION: ICD-10-CM

## 2019-06-03 DIAGNOSIS — I07.1 SEVERE TRICUSPID REGURGITATION: ICD-10-CM

## 2019-06-03 DIAGNOSIS — I10 ESSENTIAL HYPERTENSION, BENIGN: ICD-10-CM

## 2019-06-03 DIAGNOSIS — I71.019 DISSECTION OF THORACIC AORTA (HCC): ICD-10-CM

## 2019-06-03 DIAGNOSIS — I71.40 ABDOMINAL AORTIC ANEURYSM (AAA) WITHOUT RUPTURE (HCC): ICD-10-CM

## 2019-06-03 DIAGNOSIS — R94.31 NONSPECIFIC ABNORMAL ELECTROCARDIOGRAM (ECG) (EKG): ICD-10-CM

## 2019-06-03 DIAGNOSIS — E78.5 DYSLIPIDEMIA: ICD-10-CM

## 2019-06-03 PROCEDURE — 99214 OFFICE O/P EST MOD 30 MIN: CPT | Performed by: INTERNAL MEDICINE

## 2019-06-03 ASSESSMENT — ENCOUNTER SYMPTOMS
WEAKNESS: 0
BLURRED VISION: 0
SORE THROAT: 0
CHILLS: 0
ABDOMINAL PAIN: 0
PALPITATIONS: 0
COUGH: 0
FOCAL WEAKNESS: 0
SHORTNESS OF BREATH: 0
PND: 0
NAUSEA: 0
CLAUDICATION: 0
DIZZINESS: 0
FALLS: 0
BRUISES/BLEEDS EASILY: 0
FEVER: 0

## 2019-06-03 NOTE — LETTER
[facility] [address]  Phone: [phone]  Fax: [phone]              Janet Bartlett  11/24/1927    Encounter Date: 6/3/2019    Juancarlos Graves M.D.          PROGRESS NOTE:  Chief Complaint   Patient presents with   • Abdominal Aortic Aneurysm       Subjective:   Janet Bartlett is a 89 y.o. female who presents today for follow-up of her history of small AAA and then severe tricuspid regurgitation    She is been doing well over the last 6 months main issue is right knee arthritis and hip    Breathing is been stable on the supplemental oxygen  Past Medical History:   Diagnosis Date   • Abdominal aortic aneurysm (AAA) without rupture (HCC)     Small saccular aneurysm distal abdominal aorta    • Aortic dissection (HCC)     [place] type B, healed as of last computed tomography scan in 2014.   • Aspiration pneumonia (HCC) 5/2016   • Bronchiectasis (HCC)    • Empyema ([facility]) 5/2016    Left hemithorax, successfully drained and treated   • Hypercholesteremia    • Hypertension    • LVH (left ventricular hypertrophy) 2012    Anterior Q waves, probably due to LVH, normal wall motion and LVH confirmed by echo.    • Mitral regurgitation 06/2018    Echocardiogram with normal LV size, LVEF 65%. Severely increased RA. Moderate-severe MR. Moderate TR.   • Nonspecific abnormal electrocardiogram (ECG) (EKG)     See above, pattern stable.   • Pulmonary hypertension (HCC)    • Respiratory failure (HCC) 5/2016   • Sepsis ([facility]) 5/2016   • Tricuspid regurgitation 06/2018    Echocardiogram with moderate TR.   • Valvular heart disease      Past Surgical History:   Procedure Laterality Date   • THORACOTOMY Left 5/27/2016    Procedure: THORACOTOMY Mini with;  Surgeon: Jose Lafleur M.D.;  Location: SURGERY [facility];  Service:    • THORACOSCOPY Left 5/27/2016    Procedure: THORACOSCOPY;  Surgeon: Jose Lafleur M.D.;  Location:  SURGERY [facility];  Service:    • HIP ARTHROPLASTY TOTAL Left     Prior to the above.   • OTHER ORTHOPEDIC SURGERY  open fracture reduction    Left arm     Family History   Problem Relation Age of Onset   • Heart Attack Mother    • Heart Attack Father    • Other Daughter         Multiple Sclerosis     Social History     Social History   • Marital status: Single     Spouse name: N/A   • Number of children: N/A   • Years of education: N/A     Occupational History   • Not on file.     Social History Main Topics   • Smoking status: Never Smoker   • Smokeless tobacco: Never Used   • Alcohol use No   • Drug use: No   • Sexual activity: Not on file     Other Topics Concern   • Not on file     Social History Narrative   • No narrative on file     Allergies   Allergen Reactions   • Penicillins Hives     Outpatient Encounter Prescriptions as of 6/3/2019   Medication Sig Dispense Refill   • predniSONE (DELTASONE) 20 MG Tab Take 20 mg by mouth every day.     • metoprolol SR (TOPROL XL) 25 MG TABLET SR 24 HR Take 0.5 Tabs by mouth every day. 15 Tab 6   • Ibuprofen (ADVIL) 200 MG Cap Take  by mouth.     • aspirin (ASA) 81 MG Chew Tab chewable tablet 1 Tab by Per NG Tube route every day. 100 Tab 11   • benzonatate (TESSALON) 100 MG Cap Take 100 mg by mouth 3 times a day as needed for Cough.       No facility-administered encounter medications on file as of 6/3/2019.      Review of Systems   Constitutional: Negative for chills and fever.   HENT: Negative for sore throat.    Eyes: Negative for blurred vision.   Respiratory: Negative for cough and shortness of breath.    Cardiovascular: Negative for chest pain, palpitations, claudication, leg swelling and PND.   Gastrointestinal: Negative for abdominal pain and nausea.   Musculoskeletal: Negative for falls and joint pain.   Skin: Negative for rash.   Neurological: Negative for dizziness, focal weakness and weakness.   Endo/Heme/Allergies: Does not bruise/bleed easily.        "Objective:   /80 (BP Location: Left arm, Patient Position: Sitting, BP Cuff Size: Adult)   Pulse 93   Ht 1.727 m (5' 8\")   Wt 43.1 kg (95 lb)   SpO2 89%   BMI 14.44 kg/m²      Physical Exam   Constitutional: No distress.   HENT:   Mouth/Throat: Oropharynx is clear and moist. No oropharyngeal exudate.   Eyes: No scleral icterus.   Neck: No JVD present.   Cardiovascular: Normal rate.  Exam reveals no gallop and no friction rub.    Murmur heard.  Pulmonary/Chest: No respiratory distress. She has no wheezes. She has no rales.   Abdominal: Soft. Bowel sounds are normal.   Musculoskeletal: She exhibits no edema.   Neurological: She is alert.   Skin: No rash noted. She is not diaphoretic.   Psychiatric: She has a normal mood and affect.       Assessment:     1. Abdominal aortic aneurysm (AAA) without rupture (HCC)     2. Dissection of thoracic aorta (HCC)     3. Essential hypertension, benign     4. Nonspecific abnormal electrocardiogram (ECG) (EKG)     5. Sinus tachycardia     6. Non-rheumatic mitral regurgitation     7. Severe tricuspid regurgitation     8. Dyslipidemia         Medical Decision Making:  Today's Assessment / Status / Plan:     It was my pleasure to meet with Ms. Bartlett.    Her main concern is arthritis for which I suggested the following    lidocaine patches or jelly (safest)    You can safely ibuprofen (200 mg 2 tabs) but monitor for stomach, take with food and don't over do it    If ineffective think about tramadol or the opiates    Maybe even consider CBDs (marijuana product)       For her tricuspid regurgitation she has been asymptomatic swollen pursue anything further    I will see Ms. Bartlett back in 1 year time and encouraged her to follow up with us over the phone or e-mail using my MyChart as issues arise.    It is my pleasure to participate in the care of Ms. Bartlett.  Please do not hesitate to contact me with questions or concerns.    Juancarlos Graves MD PhD [facility]  Cardiologist [facility]    Please note that this dictation was created using voice recognition software. I have worked with consultants from the vendor as well as technical experts from [facility] at the [facility] to optimize the interface. I have made every reasonable attempt to correct obvious errors, but I expect that there are errors of grammar and possibly content I did not discover before finalizing the note.           Kym Muhammad M.D.  [address] [place] [address] [place] [address]  VIA Facsimile: [phone]     Lexx Gay M.D.  [address] [facility] [place] [address]  VIA Facsimile: [phone]

## 2019-06-03 NOTE — PATIENT INSTRUCTIONS
lidocaine patches or jelly (safest)    You can safely ibuprofen (200 mg 2 tabs) but monitor for stomach, take with food and don't over do it    If ineffective think about tramadol or the opiates    Maybe even consider CBDs (marijuana product)

## 2019-06-03 NOTE — PROGRESS NOTES
Chief Complaint   Patient presents with   • Abdominal Aortic Aneurysm       Subjective:   Janet Bartlett is a 89 y.o. female who presents today for follow-up of her history of small AAA and then severe tricuspid regurgitation    She is been doing well over the last 6 months main issue is right knee arthritis and hip    Breathing is been stable on the supplemental oxygen  Past Medical History:   Diagnosis Date   • Abdominal aortic aneurysm (AAA) without rupture (HCC)     Small saccular aneurysm distal abdominal aorta    • Aortic dissection (HCC)     [place] type B, healed as of last computed tomography scan in 2014.   • Aspiration pneumonia ([facility]) 5/2016   • Bronchiectasis ([facility])    • Empyema ([facility]) 5/2016    Left hemithorax, successfully drained and treated   • Hypercholesteremia    • Hypertension    • LVH (left ventricular hypertrophy) 2012    Anterior Q waves, probably due to LVH, normal wall motion and LVH confirmed by echo.    • Mitral regurgitation 06/2018    Echocardiogram with normal LV size, LVEF 65%. Severely increased RA. Moderate-severe MR. Moderate TR.   • Nonspecific abnormal electrocardiogram (ECG) (EKG)     See above, pattern stable.   • Pulmonary hypertension (HCC)    • Respiratory failure ([facility]) 5/2016   • Sepsis ([facility]) 5/2016   • Tricuspid regurgitation 06/2018    Echocardiogram with moderate TR.   • Valvular heart disease      Past Surgical History:   Procedure Laterality Date   • THORACOTOMY Left 5/27/2016    Procedure: THORACOTOMY Mini with;  Surgeon: Jose Lafleur M.D.;  Location: SURGERY [facility];  Service:    • THORACOSCOPY Left 5/27/2016    Procedure: THORACOSCOPY;  Surgeon: Jose Lafleur M.D.;  Location: SURGERY [facility];  Service:    • HIP ARTHROPLASTY TOTAL Left     Prior to the above.   • OTHER ORTHOPEDIC SURGERY  open fracture reduction    Left arm     Family History   Problem Relation Age of Onset   • Heart Attack Mother    • Heart Attack Father    • Other Daughter   "       Multiple Sclerosis     Social History     Social History   • Marital status: Single     Spouse name: N/A   • Number of children: N/A   • Years of education: N/A     Occupational History   • Not on file.     Social History Main Topics   • Smoking status: Never Smoker   • Smokeless tobacco: Never Used   • Alcohol use No   • Drug use: No   • Sexual activity: Not on file     Other Topics Concern   • Not on file     Social History Narrative   • No narrative on file     Allergies   Allergen Reactions   • Penicillins Hives     Outpatient Encounter Prescriptions as of 6/3/2019   Medication Sig Dispense Refill   • predniSONE (DELTASONE) 20 MG Tab Take 20 mg by mouth every day.     • metoprolol SR (TOPROL XL) 25 MG TABLET SR 24 HR Take 0.5 Tabs by mouth every day. 15 Tab 6   • Ibuprofen (ADVIL) 200 MG Cap Take  by mouth.     • aspirin (ASA) 81 MG Chew Tab chewable tablet 1 Tab by Per NG Tube route every day. 100 Tab 11   • benzonatate (TESSALON) 100 MG Cap Take 100 mg by mouth 3 times a day as needed for Cough.       No facility-administered encounter medications on file as of 6/3/2019.      Review of Systems   Constitutional: Negative for chills and fever.   HENT: Negative for sore throat.    Eyes: Negative for blurred vision.   Respiratory: Negative for cough and shortness of breath.    Cardiovascular: Negative for chest pain, palpitations, claudication, leg swelling and PND.   Gastrointestinal: Negative for abdominal pain and nausea.   Musculoskeletal: Negative for falls and joint pain.   Skin: Negative for rash.   Neurological: Negative for dizziness, focal weakness and weakness.   Endo/Heme/Allergies: Does not bruise/bleed easily.        Objective:   /80 (BP Location: Left arm, Patient Position: Sitting, BP Cuff Size: Adult)   Pulse 93   Ht 1.727 m (5' 8\")   Wt 43.1 kg (95 lb)   SpO2 89%   BMI 14.44 kg/m²     Physical Exam   Constitutional: No distress.   HENT:   Mouth/Throat: Oropharynx is clear and " moist. No oropharyngeal exudate.   Eyes: No scleral icterus.   Neck: No JVD present.   Cardiovascular: Normal rate.  Exam reveals no gallop and no friction rub.    Murmur heard.  Pulmonary/Chest: No respiratory distress. She has no wheezes. She has no rales.   Abdominal: Soft. Bowel sounds are normal.   Musculoskeletal: She exhibits no edema.   Neurological: She is alert.   Skin: No rash noted. She is not diaphoretic.   Psychiatric: She has a normal mood and affect.       Assessment:     1. Abdominal aortic aneurysm (AAA) without rupture (HCC)     2. Dissection of thoracic aorta (HCC)     3. Essential hypertension, benign     4. Nonspecific abnormal electrocardiogram (ECG) (EKG)     5. Sinus tachycardia     6. Non-rheumatic mitral regurgitation     7. Severe tricuspid regurgitation     8. Dyslipidemia         Medical Decision Making:  Today's Assessment / Status / Plan:     It was my pleasure to meet with Ms. Bartlett.    Her main concern is arthritis for which I suggested the following    lidocaine patches or jelly (safest)    You can safely ibuprofen (200 mg 2 tabs) but monitor for stomach, take with food and don't over do it    If ineffective think about tramadol or the opiates    Maybe even consider CBDs (marijuana product)       For her tricuspid regurgitation she has been asymptomatic swollen pursue anything further    I will see Ms. Bartlett back in 1 year time and encouraged her to follow up with us over the phone or e-mail using my MyChart as issues arise.    It is my pleasure to participate in the care of Ms. Bartlett.  Please do not hesitate to contact me with questions or concerns.    Juancarlos Graves MD PhD FAC  Cardiologist [facility]    Please note that this dictation was created using voice recognition software. I have worked with consultants from the vendor as well as technical experts from [facility] to optimize the interface. I have made every reasonable attempt  to correct obvious errors, but I expect that there are errors of grammar and possibly content I did not discover before finalizing the note.

## 2019-06-13 ENCOUNTER — TELEPHONE (OUTPATIENT)
Dept: CARDIOLOGY | Facility: MEDICAL CENTER | Age: 84
End: 2019-06-13

## 2019-06-13 NOTE — TELEPHONE ENCOUNTER
Notice:   Scan on 6/7/2019  7:19 AM by Jeremie Leigh : 05/28/2019 [employer]   FW: Edit   Received: 3 days ago Message Contents   Juancarlos Graves M.D.  KASANDRA Kay look good, please let her know     Thank you      Returned patient call and reviewed MD recommendations.  She verbalizes understanding and is appreciative of information given.

## 2019-11-21 ENCOUNTER — OFFICE VISIT (OUTPATIENT)
Dept: PULMONOLOGY | Facility: HOSPICE | Age: 84
End: 2019-11-21
Payer: MEDICARE

## 2019-11-21 VITALS
BODY MASS INDEX: 15.46 KG/M2 | SYSTOLIC BLOOD PRESSURE: 130 MMHG | OXYGEN SATURATION: 90 % | RESPIRATION RATE: 16 BRPM | HEIGHT: 68 IN | HEART RATE: 68 BPM | WEIGHT: 102 LBS | DIASTOLIC BLOOD PRESSURE: 60 MMHG

## 2019-11-21 DIAGNOSIS — I27.20 PULMONARY HYPERTENSION (HCC): ICD-10-CM

## 2019-11-21 DIAGNOSIS — G47.34 NOCTURNAL HYPOXEMIA: ICD-10-CM

## 2019-11-21 DIAGNOSIS — J44.9 CHRONIC OBSTRUCTIVE PULMONARY DISEASE, UNSPECIFIED COPD TYPE (HCC): ICD-10-CM

## 2019-11-21 PROCEDURE — 99213 OFFICE O/P EST LOW 20 MIN: CPT | Performed by: PHYSICIAN ASSISTANT

## 2019-11-21 ASSESSMENT — ENCOUNTER SYMPTOMS
DIAPHORESIS: 0
WHEEZING: 0
SHORTNESS OF BREATH: 1
SPUTUM PRODUCTION: 0
ROS GI COMMENTS: NO DENTURES, OCCASIONAL DIFFICULTY SWALLOWING
SINUS PAIN: 0
PALPITATIONS: 1
SORE THROAT: 0
EYES NEGATIVE: 1
CHILLS: 0
HEARTBURN: 0
DIZZINESS: 0
TREMORS: 0
FEVER: 0
INSOMNIA: 1
HEADACHES: 0
COUGH: 1
WEIGHT LOSS: 0
CLAUDICATION: 0
ORTHOPNEA: 0

## 2019-11-21 NOTE — PROGRESS NOTES
CC: Fatigue    HPI:  Janet Bartlett is a 89 y.o. year old female here today for follow-up on COPD. Last seen in clinic 6/14/2018 by YOHANNES Paredes.  Patient is a never smoker.    Pertinent past medical history includes bronchiectasis, empyema that required decortication in 2016, aspiration pneumonia, sepsis, left ventricular hypertrophy, vasovagal syncope, valvular heart disease, dissection of thoracic aorta, respiratory failure with hypoxia, pulmonary hypertension. Patient seen recently at [facility] for AAA.  Patient previously seen by speech therapy, and given exercises to help with swallow, no aspiration observed.    Reviewed in clinic vitals including blood pressure 130/60, heart rate is 68, O2 sat of 98% on room air. Patient's body mass index is 15.51 kg/m². Exercise and nutrition counseling were performed at this visit.  Discussed smaller more frequent meals and reducing caloric intake.    Reviewed home medication regimen including Combivent, not requiring, albuterol benefit seen when occasionally required, previously on Symbicort as rescue inhaler and declines use.  O2 at 1 to 2 L at night.         Reviewed most recent imaging including transesophageal echo obtained 8/20/2018, patient experiencing severe tricuspid regurgitation, enlarged right atrium, severely dilated right ventricle, reduced right ventricular systolic function.    Chest x-ray obtained 8/2/2018 demonstrated hyperinflation consistent with COPD, no pneumonia or pulmonary edema, left midlung scar, chronic deformity left ribs, no significant change from prior exam, mild blunting costophrenic angles again noted.    Pulmonary function testing obtained 2/17/2018 demonstrated FEV1 of 1.1 L or 62% predicted, FVC of 2.02 L or 84% predicted, FEV1/FVC ratio 54%, no significant response to bronchodilators, flow volume loop consistent with obstruction, residual volume 132% predicted, TLC 99% predicted, DLCO 69% predicted.  Per  pulmonologist interpretation moderate to severe obstructive ventilatory defect, moderate air-trapping consistent with obstruction and moderate decrease in diffusion capacity.    Review of Systems   Constitutional: Positive for malaise/fatigue. Negative for chills, diaphoresis, fever and weight loss.   HENT: Negative for congestion, hearing loss, nosebleeds, sinus pain, sore throat and tinnitus.         Runny nose   Eyes: Negative.         Prescription eyeglasses    Respiratory: Positive for cough (dry) and shortness of breath (with walking a distance ). Negative for sputum production and wheezing.    Cardiovascular: Positive for palpitations (occasional ) and leg swelling (wears compression ). Negative for chest pain, orthopnea and claudication.   Gastrointestinal: Negative for heartburn.        No dentures, occasional difficulty swallowing   Skin: Negative.         Bruises alot   Neurological: Negative for dizziness, tremors and headaches.   Psychiatric/Behavioral: The patient has insomnia (occasional trouble falling back to sleep).        Past Medical History:   Diagnosis Date   • Abdominal aortic aneurysm (AAA) without rupture ([facility])     Small saccular aneurysm distal abdominal aorta    • Aortic dissection (HCC)     Pool type B, healed as of last computed tomography scan in 2014.   • Aspiration pneumonia ([facility]) 5/2016   • Bronchiectasis ([facility])    • Empyema ([facility]) 5/2016    Left hemithorax, successfully drained and treated   • Hypercholesteremia    • Hypertension    • LVH (left ventricular hypertrophy) 2012    Anterior Q waves, probably due to LVH, normal wall motion and LVH confirmed by echo.    • Mitral regurgitation 06/2018    minimal on STEPHANIE moderate to severe TR   • Nonspecific abnormal electrocardiogram (ECG) (EKG)     See above, pattern stable.   • Pulmonary hypertension ([facility])    • Respiratory failure ([facility]) 5/2016   • Sepsis ([facility]) 5/2016   • Tricuspid regurgitation 06/2018    Echocardiogram with moderate TR.    • Valvular heart disease        Past Surgical History:   Procedure Laterality Date   • THORACOTOMY Left 5/27/2016    Procedure: THORACOTOMY Mini with;  Surgeon: Jose Lafleur M.D.;  Location: SURGERY [facility];  Service:    • THORACOSCOPY Left 5/27/2016    Procedure: THORACOSCOPY;  Surgeon: Jose Lafleur M.D.;  Location: SURGERY [facility];  Service:    • HIP ARTHROPLASTY TOTAL Left     Prior to the above.   • OTHER ORTHOPEDIC SURGERY  open fracture reduction    Left arm       Family History   Problem Relation Age of Onset   • Heart Attack Mother    • Heart Attack Father    • Other Daughter         Multiple Sclerosis       Social History     Socioeconomic History   • Marital status: Single     Spouse name: Not on file   • Number of children: Not on file   • Years of education: Not on file   • Highest education level: Not on file   Occupational History   • Not on file   Social Needs   • Financial resource strain: Not on file   • Food insecurity:     Worry: Not on file     Inability: Not on file   • Transportation needs:     Medical: Not on file     Non-medical: Not on file   Tobacco Use   • Smoking status: Never Smoker   • Smokeless tobacco: Never Used   Substance and Sexual Activity   • Alcohol use: No   • Drug use: No   • Sexual activity: Not on file   Lifestyle   • Physical activity:     Days per week: Not on file     Minutes per session: Not on file   • Stress: Not on file   Relationships   • Social connections:     Talks on phone: Not on file     Gets together: Not on file     Attends L[employer] service: Not on file     Active member of club or organization: Not on file     Attends meetings of clubs or organizations: Not on file     Relationship status: Not on file   • Intimate partner violence:     Fear of current or ex partner: Not on file     Emotionally abused: Not on file     Physically abused: Not on file     Forced sexual activity: Not on file   Other Topics Concern   • Not on file  "  Social History Narrative   • Not on file       Allergies as of 11/21/2019 - Reviewed 11/21/2019   Allergen Reaction Noted   • Penicillins Hives 01/04/2012        @Vital signs for this encounter:  Vitals:    11/21/19 0857   Height: 1.727 m (5' 8\")   Weight: 46.3 kg (102 lb)   Weight % change since last entry.: 0 %   BP: 130/60   Pulse: 68   BMI (Calculated): 15.51   Resp: 16       Current medications as of today   Current Outpatient Medications   Medication Sig Dispense Refill   • ipratropium-albuterol (COMBIVENT RESPIMAT)  MCG/ACT Aero Soln Inhale 1 Puff by mouth 4 times a day.     • metoprolol SR (TOPROL XL) 25 MG TABLET SR 24 HR Take 0.5 Tabs by mouth every day. 15 Tab 6   • Ibuprofen (ADVIL) 200 MG Cap Take  by mouth.     • aspirin (ASA) 81 MG Chew Tab chewable tablet 1 Tab by Per NG Tube route every day. 100 Tab 11   • predniSONE (DELTASONE) 20 MG Tab Take 20 mg by mouth every day.       No current facility-administered medications for this visit.          Physical Exam:   Gen:           Alert and oriented, No apparent distress. Mood and affect     appropriate, normal interaction with provider.  Eyes:          sclere white, conjunctive moist.  Hearing:     Grossly intact.  Dentition:    Fair dentition.  Oropharynx:   Tongue normal, posterior pharynx without erythema or exudate.  Neck:        Supple, trachea midline, no masses.  Respiratory Effort: No intercostal retractions or use of accessory muscles.   Lung Auscultation:      Diminished; no rales, rhonchi or wheezing.  CV:            Regular rate and rhythm.  Trace edema, and compression planes. No murmurs, rubs or gallops.  Digits, Nails, Ext: No clubbing, cyanosis, petechiae, or nodes.   Skin:        No rashes, lesions or ulcers noted on back or exposed skin surfaces.                     Assessment:  1. Chronic obstructive pulmonary disease, unspecified COPD type (HCC)   continue albuterol as needed   2. Pulmonary hypertension (HCC)   follows with "    3. BMI less than 19,adult   encouraged nutritional supplement, frequent small meals, increase calories   4. Nocturnal hypoxemia   continue O2 at 1 to 2 L at night       Immunizations:    Flu: 9/26/2019  Pneumovax 23: 4/24/2009  Prevnar 13: 10/3/2017    Plan:  1-encouraged increased calorie intake   2-Vit D3 as directed by PCP  3-patient stopped taking Symbicort due to cost and lack of perceived benefit   4-similarly not needing combivent and expensive, okay to discontinue  4-continue Albuterol as needed, use up combivent as needed as well   5-follow up one year, sooner if needed     This dictation was created using voice recognition software. The accuracy of the dictation is limited to the abilities of the software. I expect there may be some errors of grammar and possibly content.

## 2019-11-21 NOTE — PATIENT INSTRUCTIONS
1-encouraged increased calorie intake   2-Vit D3  3-not needing combivent and expensive, okay to discontinue  4-continue Albuterol as needed, use up combivent as needed as well   5-follow up one year, sooner if needed

## 2020-01-10 ENCOUNTER — HOSPITAL ENCOUNTER (OUTPATIENT)
Facility: MEDICAL CENTER | Age: 85
End: 2020-01-10
Payer: MEDICARE